# Patient Record
Sex: MALE | Race: WHITE | NOT HISPANIC OR LATINO
[De-identification: names, ages, dates, MRNs, and addresses within clinical notes are randomized per-mention and may not be internally consistent; named-entity substitution may affect disease eponyms.]

---

## 2017-01-31 ENCOUNTER — APPOINTMENT (OUTPATIENT)
Dept: CARDIOLOGY | Facility: CLINIC | Age: 72
End: 2017-01-31

## 2017-01-31 VITALS
DIASTOLIC BLOOD PRESSURE: 75 MMHG | HEART RATE: 77 BPM | SYSTOLIC BLOOD PRESSURE: 131 MMHG | BODY MASS INDEX: 26.46 KG/M2 | WEIGHT: 155 LBS | HEIGHT: 64 IN | TEMPERATURE: 98.3 F | OXYGEN SATURATION: 97 %

## 2017-01-31 RX ORDER — CLOTRIMAZOLE AND BETAMETHASONE DIPROPIONATE 10; .5 MG/G; MG/G
1-0.05 CREAM TOPICAL
Qty: 45 | Refills: 0 | Status: DISCONTINUED | COMMUNITY
Start: 2016-09-19

## 2017-01-31 RX ORDER — ATORVASTATIN CALCIUM 10 MG/1
10 TABLET, FILM COATED ORAL
Qty: 90 | Refills: 0 | Status: DISCONTINUED | COMMUNITY
Start: 2016-05-10

## 2017-02-01 LAB
ALBUMIN SERPL ELPH-MCNC: 4.4 G/DL
ALP BLD-CCNC: 91 U/L
ALT SERPL-CCNC: 38 U/L
ANION GAP SERPL CALC-SCNC: 21 MMOL/L
AST SERPL-CCNC: 35 U/L
BASOPHILS # BLD AUTO: 0.04 K/UL
BASOPHILS NFR BLD AUTO: 0.7 %
BILIRUB SERPL-MCNC: 0.4 MG/DL
BUN SERPL-MCNC: 11 MG/DL
CALCIUM SERPL-MCNC: 9.5 MG/DL
CHLORIDE SERPL-SCNC: 102 MMOL/L
CHOLEST SERPL-MCNC: 158 MG/DL
CHOLEST/HDLC SERPL: 2.7 RATIO
CK SERPL-CCNC: 290 U/L
CO2 SERPL-SCNC: 22 MMOL/L
CREAT SERPL-MCNC: 0.83 MG/DL
EOSINOPHIL # BLD AUTO: 0.27 K/UL
EOSINOPHIL NFR BLD AUTO: 4.6 %
GLUCOSE SERPL-MCNC: 105 MG/DL
HCT VFR BLD CALC: 42.6 %
HDLC SERPL-MCNC: 59 MG/DL
HGB BLD-MCNC: 14.1 G/DL
IMM GRANULOCYTES NFR BLD AUTO: 0.5 %
LDLC SERPL CALC-MCNC: 83 MG/DL
LYMPHOCYTES # BLD AUTO: 1.94 K/UL
LYMPHOCYTES NFR BLD AUTO: 32.9 %
MAN DIFF?: NORMAL
MCHC RBC-ENTMCNC: 30.4 PG
MCHC RBC-ENTMCNC: 33.1 GM/DL
MCV RBC AUTO: 91.8 FL
MONOCYTES # BLD AUTO: 0.56 K/UL
MONOCYTES NFR BLD AUTO: 9.5 %
NEUTROPHILS # BLD AUTO: 3.06 K/UL
NEUTROPHILS NFR BLD AUTO: 51.8 %
PLATELET # BLD AUTO: 257 K/UL
POTASSIUM SERPL-SCNC: 4.5 MMOL/L
PROT SERPL-MCNC: 7.6 G/DL
RBC # BLD: 4.64 M/UL
RBC # FLD: 13.4 %
SODIUM SERPL-SCNC: 145 MMOL/L
TRIGL SERPL-MCNC: 82 MG/DL
TSH SERPL-ACNC: 1.13 UIU/ML
WBC # FLD AUTO: 5.9 K/UL

## 2017-06-06 ENCOUNTER — NON-APPOINTMENT (OUTPATIENT)
Age: 72
End: 2017-06-06

## 2017-06-06 ENCOUNTER — APPOINTMENT (OUTPATIENT)
Dept: CARDIOLOGY | Facility: CLINIC | Age: 72
End: 2017-06-06

## 2017-06-06 VITALS
SYSTOLIC BLOOD PRESSURE: 130 MMHG | HEIGHT: 64 IN | HEART RATE: 65 BPM | WEIGHT: 154 LBS | BODY MASS INDEX: 26.29 KG/M2 | DIASTOLIC BLOOD PRESSURE: 82 MMHG | OXYGEN SATURATION: 96 %

## 2017-06-08 LAB
ALBUMIN SERPL ELPH-MCNC: 4.7 G/DL
ALP BLD-CCNC: 84 U/L
ALT SERPL-CCNC: 26 U/L
ANION GAP SERPL CALC-SCNC: 17 MMOL/L
AST SERPL-CCNC: 26 U/L
BASOPHILS # BLD AUTO: 0.02 K/UL
BASOPHILS NFR BLD AUTO: 0.3 %
BILIRUB SERPL-MCNC: 0.9 MG/DL
BUN SERPL-MCNC: 11 MG/DL
CALCIUM SERPL-MCNC: 9.3 MG/DL
CHLORIDE SERPL-SCNC: 102 MMOL/L
CHOLEST SERPL-MCNC: 228 MG/DL
CHOLEST/HDLC SERPL: 3.5 RATIO
CK SERPL-CCNC: 188 U/L
CO2 SERPL-SCNC: 25 MMOL/L
CREAT SERPL-MCNC: 0.83 MG/DL
EOSINOPHIL # BLD AUTO: 0.35 K/UL
EOSINOPHIL NFR BLD AUTO: 5.6 %
GLUCOSE SERPL-MCNC: 95 MG/DL
HCT VFR BLD CALC: 44.7 %
HDLC SERPL-MCNC: 66 MG/DL
HGB BLD-MCNC: 14.7 G/DL
IMM GRANULOCYTES NFR BLD AUTO: 0.2 %
LDLC SERPL CALC-MCNC: 123 MG/DL
LYMPHOCYTES # BLD AUTO: 1.72 K/UL
LYMPHOCYTES NFR BLD AUTO: 27.7 %
MAN DIFF?: NORMAL
MCHC RBC-ENTMCNC: 30.7 PG
MCHC RBC-ENTMCNC: 32.9 GM/DL
MCV RBC AUTO: 93.3 FL
MONOCYTES # BLD AUTO: 0.42 K/UL
MONOCYTES NFR BLD AUTO: 6.8 %
NEUTROPHILS # BLD AUTO: 3.69 K/UL
NEUTROPHILS NFR BLD AUTO: 59.4 %
PLATELET # BLD AUTO: 263 K/UL
POTASSIUM SERPL-SCNC: 4.6 MMOL/L
PROT SERPL-MCNC: 7.8 G/DL
RBC # BLD: 4.79 M/UL
RBC # FLD: 13.8 %
SODIUM SERPL-SCNC: 144 MMOL/L
TRIGL SERPL-MCNC: 193 MG/DL
WBC # FLD AUTO: 6.21 K/UL

## 2017-07-13 ENCOUNTER — RX RENEWAL (OUTPATIENT)
Age: 72
End: 2017-07-13

## 2017-08-01 ENCOUNTER — APPOINTMENT (OUTPATIENT)
Dept: OTHER | Facility: CLINIC | Age: 72
End: 2017-08-01
Payer: COMMERCIAL

## 2017-08-01 VITALS
HEART RATE: 82 BPM | SYSTOLIC BLOOD PRESSURE: 114 MMHG | HEIGHT: 64 IN | DIASTOLIC BLOOD PRESSURE: 76 MMHG | OXYGEN SATURATION: 96 %

## 2017-08-01 PROCEDURE — 99397 PER PM REEVAL EST PAT 65+ YR: CPT

## 2017-08-01 PROCEDURE — 99214 OFFICE O/P EST MOD 30 MIN: CPT | Mod: 25

## 2017-08-01 PROCEDURE — 96150: CPT

## 2017-08-01 PROCEDURE — 94010 BREATHING CAPACITY TEST: CPT

## 2017-08-02 LAB
ALBUMIN SERPL ELPH-MCNC: 4.6 G/DL
ALP BLD-CCNC: 80 U/L
ALT SERPL-CCNC: 31 U/L
ANION GAP SERPL CALC-SCNC: 18 MMOL/L
APPEARANCE: CLEAR
AST SERPL-CCNC: 31 U/L
BASOPHILS # BLD AUTO: 0.03 K/UL
BASOPHILS NFR BLD AUTO: 0.5 %
BILIRUB SERPL-MCNC: 0.5 MG/DL
BILIRUBIN URINE: NEGATIVE
BLOOD URINE: NEGATIVE
BUN SERPL-MCNC: 14 MG/DL
CALCIUM SERPL-MCNC: 9.7 MG/DL
CHLORIDE SERPL-SCNC: 102 MMOL/L
CHOLEST SERPL-MCNC: 205 MG/DL
CHOLEST/HDLC SERPL: 3.4 RATIO
CO2 SERPL-SCNC: 21 MMOL/L
COLOR: YELLOW
CREAT SERPL-MCNC: 0.94 MG/DL
EOSINOPHIL # BLD AUTO: 0.35 K/UL
EOSINOPHIL NFR BLD AUTO: 5.8 %
GLUCOSE QUALITATIVE U: NORMAL MG/DL
GLUCOSE SERPL-MCNC: 111 MG/DL
HCT VFR BLD CALC: 43.4 %
HDLC SERPL-MCNC: 60 MG/DL
HGB BLD-MCNC: 14.1 G/DL
IMM GRANULOCYTES NFR BLD AUTO: 0.3 %
KETONES URINE: NEGATIVE
LDLC SERPL CALC-MCNC: 99 MG/DL
LEUKOCYTE ESTERASE URINE: NEGATIVE
LYMPHOCYTES # BLD AUTO: 1.81 K/UL
LYMPHOCYTES NFR BLD AUTO: 30.2 %
MAN DIFF?: NORMAL
MCHC RBC-ENTMCNC: 30.1 PG
MCHC RBC-ENTMCNC: 32.5 GM/DL
MCV RBC AUTO: 92.5 FL
MONOCYTES # BLD AUTO: 0.4 K/UL
MONOCYTES NFR BLD AUTO: 6.7 %
NEUTROPHILS # BLD AUTO: 3.38 K/UL
NEUTROPHILS NFR BLD AUTO: 56.5 %
NITRITE URINE: NEGATIVE
PH URINE: 6
PLATELET # BLD AUTO: 278 K/UL
POTASSIUM SERPL-SCNC: 4.4 MMOL/L
PROT SERPL-MCNC: 7.5 G/DL
PROTEIN URINE: NEGATIVE MG/DL
RBC # BLD: 4.69 M/UL
RBC # FLD: 14.2 %
SODIUM SERPL-SCNC: 141 MMOL/L
SPECIFIC GRAVITY URINE: 1.01
TRIGL SERPL-MCNC: 229 MG/DL
UROBILINOGEN URINE: NORMAL MG/DL
WBC # FLD AUTO: 5.99 K/UL

## 2017-10-24 ENCOUNTER — APPOINTMENT (OUTPATIENT)
Dept: OTOLARYNGOLOGY | Facility: CLINIC | Age: 72
End: 2017-10-24

## 2017-12-14 ENCOUNTER — APPOINTMENT (OUTPATIENT)
Dept: CARDIOLOGY | Facility: CLINIC | Age: 72
End: 2017-12-14
Payer: MEDICARE

## 2017-12-14 ENCOUNTER — NON-APPOINTMENT (OUTPATIENT)
Age: 72
End: 2017-12-14

## 2017-12-14 VITALS
DIASTOLIC BLOOD PRESSURE: 81 MMHG | HEART RATE: 75 BPM | BODY MASS INDEX: 26.8 KG/M2 | TEMPERATURE: 98.3 F | OXYGEN SATURATION: 97 % | WEIGHT: 157 LBS | SYSTOLIC BLOOD PRESSURE: 132 MMHG | HEIGHT: 64 IN

## 2017-12-14 PROCEDURE — 93000 ELECTROCARDIOGRAM COMPLETE: CPT

## 2017-12-14 PROCEDURE — 99215 OFFICE O/P EST HI 40 MIN: CPT

## 2018-02-05 ENCOUNTER — RX RENEWAL (OUTPATIENT)
Age: 73
End: 2018-02-05

## 2018-06-12 ENCOUNTER — APPOINTMENT (OUTPATIENT)
Dept: CARDIOLOGY | Facility: CLINIC | Age: 73
End: 2018-06-12
Payer: MEDICARE

## 2018-06-12 ENCOUNTER — NON-APPOINTMENT (OUTPATIENT)
Age: 73
End: 2018-06-12

## 2018-06-12 VITALS
SYSTOLIC BLOOD PRESSURE: 137 MMHG | TEMPERATURE: 98 F | HEIGHT: 64 IN | DIASTOLIC BLOOD PRESSURE: 81 MMHG | BODY MASS INDEX: 27.14 KG/M2 | WEIGHT: 159 LBS | OXYGEN SATURATION: 96 % | HEART RATE: 62 BPM

## 2018-06-12 PROCEDURE — 99214 OFFICE O/P EST MOD 30 MIN: CPT

## 2018-06-12 PROCEDURE — 93000 ELECTROCARDIOGRAM COMPLETE: CPT

## 2018-06-13 LAB
ALBUMIN SERPL ELPH-MCNC: 4.5 G/DL
ALP BLD-CCNC: 81 U/L
ALT SERPL-CCNC: 20 U/L
ANION GAP SERPL CALC-SCNC: 14 MMOL/L
AST SERPL-CCNC: 24 U/L
BASOPHILS # BLD AUTO: 0.04 K/UL
BASOPHILS NFR BLD AUTO: 0.7 %
BILIRUB SERPL-MCNC: 0.5 MG/DL
BUN SERPL-MCNC: 14 MG/DL
CALCIUM SERPL-MCNC: 9.3 MG/DL
CHLORIDE SERPL-SCNC: 103 MMOL/L
CHOLEST SERPL-MCNC: 189 MG/DL
CHOLEST/HDLC SERPL: 2.8 RATIO
CO2 SERPL-SCNC: 23 MMOL/L
CREAT SERPL-MCNC: 0.94 MG/DL
EOSINOPHIL # BLD AUTO: 0.4 K/UL
EOSINOPHIL NFR BLD AUTO: 7.3 %
GLUCOSE SERPL-MCNC: 108 MG/DL
HCT VFR BLD CALC: 44.2 %
HDLC SERPL-MCNC: 68 MG/DL
HGB BLD-MCNC: 14 G/DL
IMM GRANULOCYTES NFR BLD AUTO: 0.2 %
LDLC SERPL CALC-MCNC: 100 MG/DL
LYMPHOCYTES # BLD AUTO: 1.52 K/UL
LYMPHOCYTES NFR BLD AUTO: 27.7 %
MAN DIFF?: NORMAL
MCHC RBC-ENTMCNC: 29.8 PG
MCHC RBC-ENTMCNC: 31.7 GM/DL
MCV RBC AUTO: 94 FL
MONOCYTES # BLD AUTO: 0.38 K/UL
MONOCYTES NFR BLD AUTO: 6.9 %
NEUTROPHILS # BLD AUTO: 3.13 K/UL
NEUTROPHILS NFR BLD AUTO: 57.2 %
PLATELET # BLD AUTO: 240 K/UL
POTASSIUM SERPL-SCNC: 4.4 MMOL/L
PROT SERPL-MCNC: 7.2 G/DL
RBC # BLD: 4.7 M/UL
RBC # FLD: 14.4 %
SODIUM SERPL-SCNC: 140 MMOL/L
TRIGL SERPL-MCNC: 107 MG/DL
WBC # FLD AUTO: 5.48 K/UL

## 2018-06-15 ENCOUNTER — RX RENEWAL (OUTPATIENT)
Age: 73
End: 2018-06-15

## 2018-07-24 ENCOUNTER — APPOINTMENT (OUTPATIENT)
Dept: OTHER | Facility: CLINIC | Age: 73
End: 2018-07-24
Payer: COMMERCIAL

## 2018-07-24 VITALS
HEART RATE: 69 BPM | HEIGHT: 64 IN | SYSTOLIC BLOOD PRESSURE: 124 MMHG | DIASTOLIC BLOOD PRESSURE: 79 MMHG | OXYGEN SATURATION: 97 % | RESPIRATION RATE: 14 BRPM

## 2018-07-24 PROCEDURE — 96150: CPT

## 2018-07-24 PROCEDURE — 99214 OFFICE O/P EST MOD 30 MIN: CPT | Mod: 25

## 2018-07-24 PROCEDURE — 94010 BREATHING CAPACITY TEST: CPT

## 2018-07-24 PROCEDURE — 99397 PER PM REEVAL EST PAT 65+ YR: CPT | Mod: 25

## 2018-07-25 LAB
ALBUMIN SERPL ELPH-MCNC: 4.2 G/DL
ALP BLD-CCNC: 75 U/L
ALT SERPL-CCNC: 24 U/L
ANION GAP SERPL CALC-SCNC: 17 MMOL/L
APPEARANCE: CLEAR
AST SERPL-CCNC: 30 U/L
BASOPHILS # BLD AUTO: 0.02 K/UL
BASOPHILS NFR BLD AUTO: 0.3 %
BILIRUB SERPL-MCNC: 0.6 MG/DL
BILIRUBIN URINE: NEGATIVE
BLOOD URINE: NEGATIVE
BUN SERPL-MCNC: 16 MG/DL
CALCIUM SERPL-MCNC: 9.1 MG/DL
CHLORIDE SERPL-SCNC: 102 MMOL/L
CHOLEST SERPL-MCNC: 167 MG/DL
CHOLEST/HDLC SERPL: 2.9 RATIO
CO2 SERPL-SCNC: 22 MMOL/L
COLOR: YELLOW
CREAT SERPL-MCNC: 0.81 MG/DL
EOSINOPHIL # BLD AUTO: 0.36 K/UL
EOSINOPHIL NFR BLD AUTO: 6.2 %
GLUCOSE QUALITATIVE U: NEGATIVE MG/DL
GLUCOSE SERPL-MCNC: 89 MG/DL
HCT VFR BLD CALC: 40.6 %
HDLC SERPL-MCNC: 57 MG/DL
HGB BLD-MCNC: 13.7 G/DL
IMM GRANULOCYTES NFR BLD AUTO: 0.2 %
KETONES URINE: NEGATIVE
LDLC SERPL CALC-MCNC: 82 MG/DL
LEUKOCYTE ESTERASE URINE: NEGATIVE
LYMPHOCYTES # BLD AUTO: 1.8 K/UL
LYMPHOCYTES NFR BLD AUTO: 31 %
MAN DIFF?: NORMAL
MCHC RBC-ENTMCNC: 30.8 PG
MCHC RBC-ENTMCNC: 33.7 GM/DL
MCV RBC AUTO: 91.2 FL
MONOCYTES # BLD AUTO: 0.39 K/UL
MONOCYTES NFR BLD AUTO: 6.7 %
NEUTROPHILS # BLD AUTO: 3.23 K/UL
NEUTROPHILS NFR BLD AUTO: 55.6 %
NITRITE URINE: NEGATIVE
PH URINE: 7
PLATELET # BLD AUTO: 235 K/UL
POTASSIUM SERPL-SCNC: 4.4 MMOL/L
PROT SERPL-MCNC: 7 G/DL
PROTEIN URINE: NEGATIVE MG/DL
RBC # BLD: 4.45 M/UL
RBC # FLD: 13.7 %
SODIUM SERPL-SCNC: 141 MMOL/L
SPECIFIC GRAVITY URINE: 1.01
TRIGL SERPL-MCNC: 140 MG/DL
UROBILINOGEN URINE: NEGATIVE MG/DL
WBC # FLD AUTO: 5.81 K/UL

## 2018-07-30 ENCOUNTER — RX RENEWAL (OUTPATIENT)
Age: 73
End: 2018-07-30

## 2018-07-30 RX ORDER — FEXOFENADINE HYDROCHLORIDE 180 MG/1
180 TABLET, FILM COATED ORAL
Qty: 30 | Refills: 5 | Status: DISCONTINUED | COMMUNITY
Start: 2017-08-01 | End: 2018-07-30

## 2018-12-11 ENCOUNTER — NON-APPOINTMENT (OUTPATIENT)
Age: 73
End: 2018-12-11

## 2018-12-11 ENCOUNTER — APPOINTMENT (OUTPATIENT)
Dept: CARDIOLOGY | Facility: CLINIC | Age: 73
End: 2018-12-11
Payer: MEDICARE

## 2018-12-11 VITALS
HEART RATE: 70 BPM | BODY MASS INDEX: 26.8 KG/M2 | HEIGHT: 64 IN | WEIGHT: 157 LBS | DIASTOLIC BLOOD PRESSURE: 80 MMHG | SYSTOLIC BLOOD PRESSURE: 120 MMHG | OXYGEN SATURATION: 98 %

## 2018-12-11 PROCEDURE — 99214 OFFICE O/P EST MOD 30 MIN: CPT

## 2018-12-11 PROCEDURE — 93000 ELECTROCARDIOGRAM COMPLETE: CPT

## 2018-12-11 NOTE — HISTORY OF PRESENT ILLNESS
[FreeTextEntry1] : His muscle aches are not present when he takes Lipitor 20 qd.\par He continues to take fish oil.\par Walks and has no chest pain or SOB with exertion.\par Taking his meds and aspirin as directed.\par Was running toward the train and felt great.\par

## 2018-12-11 NOTE — DISCUSSION/SUMMARY
[FreeTextEntry1] : He is a 73 year old with hyperlipidemia and rheumatic fever who has evidence of coronary atherosclerosis without angina.\par No ECG changes seen.\par I encouraged exercise again and continued medical therapy.\par BP is optimal. \par If any symptoms of chest pains arise he will call.\par Continue high dose fish oil 2-4 g /day in addition to lipiotr.\par LDL is at goal. (87)\par followup in 6 months.

## 2018-12-11 NOTE — PHYSICAL EXAM
[General Appearance - Well Developed] : well developed [General Appearance - Well Nourished] : well nourished [General Appearance - In No Acute Distress] : no acute distress [Normal Conjunctiva] : the conjunctiva exhibited no abnormalities [No Oral Pallor] : no oral pallor [Normal Jugular Venous V Waves Present] : normal jugular venous V waves present [Respiration, Rhythm And Depth] : normal respiratory rhythm and effort [Auscultation Breath Sounds / Voice Sounds] : lungs were clear to auscultation bilaterally [Heart Sounds] : normal S1 and S2 [Murmurs] : no murmurs present [Arterial Pulses Normal] : the arterial pulses were normal [Edema] : no peripheral edema present [Regular] : the rhythm was regular [Bowel Sounds] : normal bowel sounds [Abdomen Soft] : soft [Abnormal Walk] : normal gait [Cyanosis, Localized] : no localized cyanosis [Skin Turgor] : normal skin turgor [Oriented To Time, Place, And Person] : oriented to person, place, and time [Impaired Insight] : insight and judgment were intact [Affect] : the affect was normal

## 2018-12-11 NOTE — REVIEW OF SYSTEMS
[see HPI] : see HPI [Shortness Of Breath] : shortness of breath [Dyspnea on exertion] : dyspnea during exertion [Chest Pain] : no chest pain [Palpitations] : no palpitations [Negative] : Heme/Lymph

## 2019-03-06 ENCOUNTER — APPOINTMENT (OUTPATIENT)
Dept: OTHER | Facility: CLINIC | Age: 74
End: 2019-03-06
Payer: COMMERCIAL

## 2019-03-06 VITALS
BODY MASS INDEX: 26.46 KG/M2 | DIASTOLIC BLOOD PRESSURE: 76 MMHG | WEIGHT: 155 LBS | HEIGHT: 64 IN | SYSTOLIC BLOOD PRESSURE: 128 MMHG | HEART RATE: 79 BPM | OXYGEN SATURATION: 96 % | RESPIRATION RATE: 15 BRPM

## 2019-03-06 DIAGNOSIS — H10.45 OTHER CHRONIC ALLERGIC CONJUNCTIVITIS: ICD-10-CM

## 2019-03-06 PROCEDURE — 99213 OFFICE O/P EST LOW 20 MIN: CPT | Mod: 25

## 2019-03-06 NOTE — HISTORY OF PRESENT ILLNESS
[FreeTextEntry1] : pt here for follow up WTC covered rhinitis and GERD \par c/o bad  " allergies" - nasal conegsiton , runny nose, itchy eyes \par  tries to use saline spray most of the time, currently using Flonase daily and Azelastine \par Allegra as needed \par He was evaluated by ENT and recommended IT and dev septum surgery, \par He was evaluated by an eye doctor and found too have cataract\par c/o itchy watery eyes- using Patanol eye drops with improvement \par \par has tried Beconase, Nasonex and Flonase in the past, feels that these work similarly; tried antihistamines in the past which he felt worked well but stopped these "because of my prostate"\par \par he stated that he does snore and was diagnosed with EVERTON shortly after 09 11 2001 but unable to tolerate the CPAP mask \par he stated that he had " walking pneumonia " and was teated with Abx 02 2018 \par  has no cough, wheezing or SOB at this time\par reported that was vaccinated for Pneumonia in the past \par Exp hx: worked at [employer] 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 \par \par \par colonoscopy 10 21 2014- 2 mm tubular adenoma \par  \par  hyperlipidemia and rheumatic fever ,  has evidence of coronary atherosclerosis without angina.\par followed by Dr LIsker , Cardiologist

## 2019-03-06 NOTE — PHYSICAL EXAM
[General Appearance - Alert] : alert [General Appearance - In No Acute Distress] : in no acute distress [Sclera] : the sclera and conjunctiva were normal [PERRL With Normal Accommodation] : pupils were equal in size, round, and reactive to light [Extraocular Movements] : extraocular movements were intact [FreeTextEntry1] : deviated nasal septum to the L, erythema L nasal septum, R nasal Congestion- swelling in nasal passaged   [Neck Appearance] : the appearance of the neck was normal [Neck Cervical Mass (___cm)] : no neck mass was observed [Jugular Venous Distention Increased] : there was no jugular-venous distention [Thyroid Diffuse Enlargement] : the thyroid was not enlarged [Thyroid Nodule] : there were no palpable thyroid nodules [Auscultation Breath Sounds / Voice Sounds] : lungs were clear to auscultation bilaterally [Heart Rate And Rhythm] : heart rate was normal and rhythm regular [Heart Sounds] : normal S1 and S2 [Heart Sounds Gallop] : no gallops [Murmurs] : no murmurs [Heart Sounds Pericardial Friction Rub] : no pericardial rub [Bowel Sounds] : normal bowel sounds [Abdomen Soft] : soft [Abdomen Tenderness] : non-tender [] : no hepato-splenomegaly [Abdomen Mass (___ Cm)] : no abdominal mass palpated

## 2019-03-06 NOTE — ASSESSMENT
[FreeTextEntry1] : all meds and sprays renewed \par  will start trial of Singulair \par pt to return in 4-6 weeks for LFT check up \par \par \par  colonoscopy - later this year \par pt will see me in 07 2019 for annual WTC visit - will refer to GI at that time for colonoscopy

## 2019-03-08 ENCOUNTER — RX RENEWAL (OUTPATIENT)
Age: 74
End: 2019-03-08

## 2019-03-15 ENCOUNTER — RX RENEWAL (OUTPATIENT)
Age: 74
End: 2019-03-15

## 2019-07-15 ENCOUNTER — APPOINTMENT (OUTPATIENT)
Dept: INTERNAL MEDICINE | Facility: CLINIC | Age: 74
End: 2019-07-15

## 2019-07-16 ENCOUNTER — APPOINTMENT (OUTPATIENT)
Dept: CARDIOLOGY | Facility: CLINIC | Age: 74
End: 2019-07-16
Payer: MEDICARE

## 2019-07-16 ENCOUNTER — NON-APPOINTMENT (OUTPATIENT)
Age: 74
End: 2019-07-16

## 2019-07-16 VITALS
OXYGEN SATURATION: 98 % | WEIGHT: 155 LBS | HEIGHT: 64 IN | BODY MASS INDEX: 26.46 KG/M2 | DIASTOLIC BLOOD PRESSURE: 72 MMHG | HEART RATE: 56 BPM | SYSTOLIC BLOOD PRESSURE: 134 MMHG | RESPIRATION RATE: 15 BRPM

## 2019-07-16 DIAGNOSIS — M17.12 UNILATERAL PRIMARY OSTEOARTHRITIS, LEFT KNEE: ICD-10-CM

## 2019-07-16 DIAGNOSIS — Z01.810 ENCOUNTER FOR PREPROCEDURAL CARDIOVASCULAR EXAMINATION: ICD-10-CM

## 2019-07-16 PROCEDURE — 93000 ELECTROCARDIOGRAM COMPLETE: CPT | Mod: NC

## 2019-07-16 PROCEDURE — 99214 OFFICE O/P EST MOD 30 MIN: CPT

## 2019-07-16 RX ORDER — ASPIRIN ENTERIC COATED TABLETS 81 MG 81 MG/1
81 TABLET, DELAYED RELEASE ORAL
Qty: 30 | Refills: 2 | Status: ACTIVE | COMMUNITY
Start: 2019-07-16

## 2019-07-16 RX ORDER — OMEGA-3/DHA/EPA/FISH OIL 300-1000MG
1000 CAPSULE ORAL
Qty: 180 | Refills: 0 | Status: ACTIVE | COMMUNITY
Start: 2019-07-16 | End: 1900-01-01

## 2019-07-16 NOTE — HISTORY OF PRESENT ILLNESS
[FreeTextEntry1] : left knee surgery planned.\par \par And he has severe arthritis in his left knee with a torn meniscus that is going to be repaired. \par He denies any exertional chest pains or shortness of breath, though he does not exert himself much of his knee pain.\par He has been taking his medications as directed and reports that recent blood work was okay.\par \par

## 2019-07-16 NOTE — DISCUSSION/SUMMARY
[FreeTextEntry1] : He is a 74 year old with hyperlipidemia and rheumatic fever who has evidence of coronary atherosclerosis without angina. Knee surgery is upcoming.\par No ECG changes seen.\par BP control is good.\par He may proceed with the planned surgery as he has no active signs of coronary disease.\par I encouraged him to continue all of his usual medications and he may hold aspirin the night before the procedure and restarted immediately thereafter.\par No other changes to his statin dose are suggested.\par \par followup in 6 months.

## 2019-07-16 NOTE — REVIEW OF SYSTEMS
[see HPI] : see HPI [Shortness Of Breath] : shortness of breath [Dyspnea on exertion] : dyspnea during exertion [Negative] : Heme/Lymph [Chest Pain] : no chest pain [Palpitations] : no palpitations

## 2019-07-22 ENCOUNTER — NON-APPOINTMENT (OUTPATIENT)
Age: 74
End: 2019-07-22

## 2019-08-20 ENCOUNTER — APPOINTMENT (OUTPATIENT)
Dept: OTHER | Facility: CLINIC | Age: 74
End: 2019-08-20
Payer: COMMERCIAL

## 2019-08-20 VITALS
WEIGHT: 156 LBS | BODY MASS INDEX: 26.63 KG/M2 | DIASTOLIC BLOOD PRESSURE: 63 MMHG | HEIGHT: 64 IN | OXYGEN SATURATION: 96 % | RESPIRATION RATE: 18 BRPM | TEMPERATURE: 98.2 F | HEART RATE: 79 BPM | SYSTOLIC BLOOD PRESSURE: 97 MMHG

## 2019-08-20 PROCEDURE — 99214 OFFICE O/P EST MOD 30 MIN: CPT | Mod: 25

## 2019-08-20 PROCEDURE — 99396 PREV VISIT EST AGE 40-64: CPT | Mod: 25

## 2019-08-20 PROCEDURE — 94010 BREATHING CAPACITY TEST: CPT

## 2019-08-20 NOTE — PHYSICAL EXAM
[General Appearance - Alert] : alert [Sclera] : the sclera and conjunctiva were normal [General Appearance - In No Acute Distress] : in no acute distress [PERRL With Normal Accommodation] : pupils were equal in size, round, and reactive to light [FreeTextEntry1] : deviated nasal septum to the L, erythema L nasal septum, R nasal Congestion- swelling in nasal passaged   [Extraocular Movements] : extraocular movements were intact [Neck Cervical Mass (___cm)] : no neck mass was observed [Neck Appearance] : the appearance of the neck was normal [Thyroid Diffuse Enlargement] : the thyroid was not enlarged [Jugular Venous Distention Increased] : there was no jugular-venous distention [Thyroid Nodule] : there were no palpable thyroid nodules [Heart Rate And Rhythm] : heart rate was normal and rhythm regular [Auscultation Breath Sounds / Voice Sounds] : lungs were clear to auscultation bilaterally [Heart Sounds Gallop] : no gallops [Heart Sounds] : normal S1 and S2 [Murmurs] : no murmurs [Heart Sounds Pericardial Friction Rub] : no pericardial rub [Abdomen Soft] : soft [Bowel Sounds] : normal bowel sounds [Abdomen Mass (___ Cm)] : no abdominal mass palpated [] : no hepato-splenomegaly [Abdomen Tenderness] : non-tender

## 2019-08-20 NOTE — HISTORY OF PRESENT ILLNESS
[FreeTextEntry1] : pt here for follow up WTC covered rhinitis and GERD \par " allergies" - nasal congestion , runny nose, itchy eyes \par  nasal sprays and eye drops are helpful with his Sx \par  tries to use saline spray most of the time, currently using Flonase daily \par Allegra as needed \par He was evaluated by ENT and recommended IT and dev septum surgery, \par He was evaluated by an eye doctor and found too have cataract\par c/o itchy watery eyes- using Patanol eye drops with improvement \par \par has tried Beconase, Nasonex and Flonase in the past, feels that these work similarly; tried antihistamines in the past which he felt worked well but stopped these "because of my prostate"\par \par he stated that he does snore and was diagnosed with EVERTON shortly after 09 11 2001 but unable to tolerate the CPAP mask \par he stated that he had " walking pneumonia " and was teated with Abx 02 2018 \par  has no cough, wheezing or SOB at this time\par reported that was vaccinated for Pneumonia in the past \par Exp hx: worked at [employer] 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 \par 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[employer]; 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 \par He denies any exertional chest pains or shortness of breath, though he does not exert himself much of his knee pain.\par  hyperlipidemia and Hx rheumatic fever ,  has evidence of coronary atherosclerosis without angina.\par followed by Dr LIsker , Cardiologist \par \par And he has severe arthritis in his left knee with a torn meniscus that is going to be repaired. \par He denies any exertional chest pains or shortness of breath, though he does not exert himself much of his knee pain.\par he also c/o R plants pain - plantar fasciitis

## 2019-08-20 NOTE — DISCUSSION/SUMMARY
[Patient seen for WTC Monitoring ___] : Patient was seen for WTC monitoring [unfilled] [FreeTextEntry3] : see Follow up follow up note  [Please See Note in Chart and Documentation in Trial DB] : Please see note in chart and documentation in Trial DB.

## 2019-08-20 NOTE — PHYSICAL EXAM
[General Appearance - Alert] : alert [General Appearance - In No Acute Distress] : in no acute distress [Sclera] : the sclera and conjunctiva were normal [PERRL With Normal Accommodation] : pupils were equal in size, round, and reactive to light [Extraocular Movements] : extraocular movements were intact [FreeTextEntry1] : deviated nasal septum to the L, erythema L nasal septum, R nasal Congestion- swelling in nasal passaged   [Neck Cervical Mass (___cm)] : no neck mass was observed [Neck Appearance] : the appearance of the neck was normal [Jugular Venous Distention Increased] : there was no jugular-venous distention [Thyroid Diffuse Enlargement] : the thyroid was not enlarged [Thyroid Nodule] : there were no palpable thyroid nodules [Heart Rate And Rhythm] : heart rate was normal and rhythm regular [Auscultation Breath Sounds / Voice Sounds] : lungs were clear to auscultation bilaterally [Heart Sounds] : normal S1 and S2 [Heart Sounds Gallop] : no gallops [Murmurs] : no murmurs [Heart Sounds Pericardial Friction Rub] : no pericardial rub [Bowel Sounds] : normal bowel sounds [Abdomen Soft] : soft [Abdomen Mass (___ Cm)] : no abdominal mass palpated [Abdomen Tenderness] : non-tender [] : no hepato-splenomegaly

## 2019-08-20 NOTE — HISTORY OF PRESENT ILLNESS
[FreeTextEntry1] : pt here for follow up WTC covered rhinitis and GERD \par " allergies" - nasal congestion , runny nose, itchy eyes \par  nasal sprays and eye drops are helpful with his Sx \par  tries to use saline spray most of the time, currently using Flonase daily \par Allegra as needed \par He was evaluated by ENT and recommended IT and dev septum surgery, \par He was evaluated by an eye doctor and found too have cataract\par c/o itchy watery eyes- using Patanol eye drops with improvement \par \par has tried Beconase, Nasonex and Flonase in the past, feels that these work similarly; tried antihistamines in the past which he felt worked well but stopped these "because of my prostate"\par \par he stated that he does snore and was diagnosed with EVERTON shortly after 09 11 2001 but unable to tolerate the CPAP mask \par he stated that he had " walking pneumonia " and was teated with Abx 02 2018 \par  has no cough, wheezing or SOB at this time\par reported that was vaccinated for Pneumonia in the past \par Exp hx: worked at [employer] 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 \par has no Sx of pulm disease \par \par \par  And he has severe arthritis in his left knee with a torn meniscus that is going to be repaired. \par He denies any exertional chest pains or shortness of breath, though he does not exert himself much of his knee pain.\par  hyperlipidemia and Hx rheumatic fever ,  has evidence of coronary atherosclerosis without angina.\par followed by Dr LIsker , Cardiologist \par \par And he has severe arthritis in his left knee with a torn meniscus that is going to be repaired. \par He denies any exertional chest pains or shortness of breath, though he does not exert himself much of his knee pain.\par he also c/o R plants pain - plantar fasciitis

## 2019-08-20 NOTE — HEALTH RISK ASSESSMENT
[ColonoscopyDate] : 10/21/2014 [Patient reported colonoscopy was abnormal] : Patient reported colonoscopy was abnormal [ColonoscopyComments] : tubular adenoma

## 2019-08-20 NOTE — DISCUSSION/SUMMARY
[Patient seen for WTC Monitoring ___] : Patient was seen for WTC monitoring [unfilled] [Please See Note in Chart and Documentation in Trial DB] : Please see note in chart and documentation in Trial DB. [FreeTextEntry3] : see Follow up follow up note

## 2019-08-20 NOTE — ASSESSMENT
[FreeTextEntry1] : all meds and nasal  sprays renewed \par  continue Singulair \par LFT check up \par \par \par  colonoscopy - due this year \par referral to GI

## 2019-08-21 LAB
ALBUMIN SERPL ELPH-MCNC: 4.3 G/DL
ALP BLD-CCNC: 63 U/L
ALT SERPL-CCNC: 17 U/L
ANION GAP SERPL CALC-SCNC: 15 MMOL/L
APPEARANCE: CLEAR
AST SERPL-CCNC: 22 U/L
BACTERIA: NEGATIVE
BASOPHILS # BLD AUTO: 0.03 K/UL
BASOPHILS NFR BLD AUTO: 0.5 %
BILIRUB SERPL-MCNC: 0.5 MG/DL
BILIRUBIN URINE: NEGATIVE
BLOOD URINE: NEGATIVE
BUN SERPL-MCNC: 11 MG/DL
CALCIUM SERPL-MCNC: 9.4 MG/DL
CHLORIDE SERPL-SCNC: 102 MMOL/L
CHOLEST SERPL-MCNC: 158 MG/DL
CHOLEST/HDLC SERPL: 3.2 RATIO
CO2 SERPL-SCNC: 22 MMOL/L
COLOR: NORMAL
CREAT SERPL-MCNC: 0.86 MG/DL
EOSINOPHIL # BLD AUTO: 0.29 K/UL
EOSINOPHIL NFR BLD AUTO: 4.9 %
GLUCOSE QUALITATIVE U: NEGATIVE
GLUCOSE SERPL-MCNC: 107 MG/DL
HCT VFR BLD CALC: 40.6 %
HDLC SERPL-MCNC: 49 MG/DL
HGB BLD-MCNC: 13.2 G/DL
HYALINE CASTS: 0 /LPF
IMM GRANULOCYTES NFR BLD AUTO: 0.3 %
KETONES URINE: NEGATIVE
LDLC SERPL CALC-MCNC: 54 MG/DL
LEUKOCYTE ESTERASE URINE: NEGATIVE
LYMPHOCYTES # BLD AUTO: 1.36 K/UL
LYMPHOCYTES NFR BLD AUTO: 22.9 %
MAN DIFF?: NORMAL
MCHC RBC-ENTMCNC: 30.4 PG
MCHC RBC-ENTMCNC: 32.5 GM/DL
MCV RBC AUTO: 93.5 FL
MICROSCOPIC-UA: NORMAL
MONOCYTES # BLD AUTO: 0.56 K/UL
MONOCYTES NFR BLD AUTO: 9.4 %
NEUTROPHILS # BLD AUTO: 3.69 K/UL
NEUTROPHILS NFR BLD AUTO: 62 %
NITRITE URINE: NEGATIVE
PH URINE: 5.5
PLATELET # BLD AUTO: 219 K/UL
POTASSIUM SERPL-SCNC: 4.2 MMOL/L
PROT SERPL-MCNC: 7 G/DL
PROTEIN URINE: NEGATIVE
RBC # BLD: 4.34 M/UL
RBC # FLD: 13.2 %
RED BLOOD CELLS URINE: 0 /HPF
SODIUM SERPL-SCNC: 139 MMOL/L
SPECIFIC GRAVITY URINE: 1.01
SQUAMOUS EPITHELIAL CELLS: 0 /HPF
TRIGL SERPL-MCNC: 275 MG/DL
UROBILINOGEN URINE: NORMAL
WBC # FLD AUTO: 5.95 K/UL
WHITE BLOOD CELLS URINE: 1 /HPF

## 2019-09-17 ENCOUNTER — RX RENEWAL (OUTPATIENT)
Age: 74
End: 2019-09-17

## 2019-09-27 ENCOUNTER — MEDICATION RENEWAL (OUTPATIENT)
Age: 74
End: 2019-09-27

## 2019-09-30 ENCOUNTER — RX RENEWAL (OUTPATIENT)
Age: 74
End: 2019-09-30

## 2019-10-15 ENCOUNTER — APPOINTMENT (OUTPATIENT)
Dept: GASTROENTEROLOGY | Facility: CLINIC | Age: 74
End: 2019-10-15
Payer: COMMERCIAL

## 2019-10-15 VITALS
HEIGHT: 64 IN | DIASTOLIC BLOOD PRESSURE: 68 MMHG | SYSTOLIC BLOOD PRESSURE: 120 MMHG | RESPIRATION RATE: 15 BRPM | TEMPERATURE: 98.1 F | OXYGEN SATURATION: 97 % | BODY MASS INDEX: 26.46 KG/M2 | WEIGHT: 155 LBS | HEART RATE: 73 BPM

## 2019-10-15 DIAGNOSIS — Z86.010 PERSONAL HISTORY OF COLONIC POLYPS: ICD-10-CM

## 2019-10-15 PROCEDURE — 99204 OFFICE O/P NEW MOD 45 MIN: CPT

## 2019-10-15 NOTE — PHYSICAL EXAM
[General Appearance - Alert] : alert [General Appearance - In No Acute Distress] : in no acute distress [PERRL With Normal Accommodation] : pupils were equal in size, round, and reactive to light [Extraocular Movements] : extraocular movements were intact [Sclera] : the sclera and conjunctiva were normal [Oropharynx] : the oropharynx was normal [Outer Ear] : the ears and nose were normal in appearance [Neck Appearance] : the appearance of the neck was normal [Jugular Venous Distention Increased] : there was no jugular-venous distention [Neck Cervical Mass (___cm)] : no neck mass was observed [Thyroid Diffuse Enlargement] : the thyroid was not enlarged [Thyroid Nodule] : there were no palpable thyroid nodules [Heart Rate And Rhythm] : heart rate was normal and rhythm regular [Auscultation Breath Sounds / Voice Sounds] : lungs were clear to auscultation bilaterally [Heart Sounds Gallop] : no gallops [Heart Sounds] : normal S1 and S2 [Murmurs] : no murmurs [Edema] : there was no peripheral edema [Heart Sounds Pericardial Friction Rub] : no pericardial rub [Abdomen Soft] : soft [Bowel Sounds] : normal bowel sounds [Abdomen Mass (___ Cm)] : no abdominal mass palpated [Abdomen Tenderness] : non-tender [Cervical Lymph Nodes Enlarged Anterior Bilaterally] : anterior cervical [Cervical Lymph Nodes Enlarged Posterior Bilaterally] : posterior cervical [Supraclavicular Lymph Nodes Enlarged Bilaterally] : supraclavicular [Axillary Lymph Nodes Enlarged Bilaterally] : axillary [Inguinal Lymph Nodes Enlarged Bilaterally] : inguinal [Femoral Lymph Nodes Enlarged Bilaterally] : femoral [No CVA Tenderness] : no ~M costovertebral angle tenderness [No Spinal Tenderness] : no spinal tenderness [Nail Clubbing] : no clubbing  or cyanosis of the fingernails [Abnormal Walk] : normal gait [Motor Tone] : muscle strength and tone were normal [Musculoskeletal - Swelling] : no joint swelling seen [Skin Color & Pigmentation] : normal skin color and pigmentation [] : no rash [Skin Turgor] : normal skin turgor [Impaired Insight] : insight and judgment were intact [Oriented To Time, Place, And Person] : oriented to person, place, and time [Affect] : the affect was normal [FreeTextEntry1] : ventral hernia

## 2019-10-15 NOTE — ASSESSMENT
[FreeTextEntry1] : 74 yr male, history of colon adenoma, due for surveillance\par Reflux, complaints unchanged\par No indication for repeat EGD\par \par Indications risks benefits and alternatives to colonoscopy for surveillance purposes reviewed\par He agrees

## 2019-10-15 NOTE — HISTORY OF PRESENT ILLNESS
[de-identified] : Dictation inactive\par \par 74 yr male, colon adenoma on last exam 5 yrs ago, here for follow up colooscopy.\par No interval bowel complaints\par Stable reflux complaints, now on famotidine prn.  Uses approximately 2 times a week\par No dysphagia\par EGD 5 yrs ago without esophagitis, small hiatus hernia\par \par Denies CAD, CHF or dysrhythmia\par \par \par \par SH retired , teaching at [employer]

## 2019-10-30 ENCOUNTER — APPOINTMENT (OUTPATIENT)
Dept: GASTROENTEROLOGY | Facility: AMBULATORY MEDICAL SERVICES | Age: 74
End: 2019-10-30
Payer: COMMERCIAL

## 2019-10-30 PROCEDURE — 45378 DIAGNOSTIC COLONOSCOPY: CPT

## 2019-11-11 ENCOUNTER — RX RENEWAL (OUTPATIENT)
Age: 74
End: 2019-11-11

## 2019-11-13 ENCOUNTER — TRANSCRIPTION ENCOUNTER (OUTPATIENT)
Age: 74
End: 2019-11-13

## 2019-12-16 ENCOUNTER — RX RENEWAL (OUTPATIENT)
Age: 74
End: 2019-12-16

## 2020-01-29 ENCOUNTER — APPOINTMENT (OUTPATIENT)
Dept: CARDIOLOGY | Facility: CLINIC | Age: 75
End: 2020-01-29
Payer: MEDICARE

## 2020-01-29 ENCOUNTER — NON-APPOINTMENT (OUTPATIENT)
Age: 75
End: 2020-01-29

## 2020-01-29 VITALS
OXYGEN SATURATION: 98 % | BODY MASS INDEX: 26.43 KG/M2 | HEART RATE: 73 BPM | SYSTOLIC BLOOD PRESSURE: 140 MMHG | WEIGHT: 154 LBS | DIASTOLIC BLOOD PRESSURE: 70 MMHG

## 2020-01-29 DIAGNOSIS — I25.10 ATHEROSCLEROTIC HEART DISEASE OF NATIVE CORONARY ARTERY W/OUT ANGINA PECTORIS: ICD-10-CM

## 2020-01-29 PROCEDURE — 99214 OFFICE O/P EST MOD 30 MIN: CPT

## 2020-01-29 PROCEDURE — 93000 ELECTROCARDIOGRAM COMPLETE: CPT

## 2020-01-29 NOTE — HISTORY OF PRESENT ILLNESS
[FreeTextEntry1] : s/p left knee surgery but is still undergoing rehab.\par No chest pain, but persistent exertional dyspena after climbing the stairs to the train.\par  \par He has been taking his medications as directed and LDL in August was 54\par

## 2020-01-29 NOTE — PHYSICAL EXAM
[General Appearance - Well Developed] : well developed [General Appearance - In No Acute Distress] : no acute distress [Normal Conjunctiva] : the conjunctiva exhibited no abnormalities [General Appearance - Well Nourished] : well nourished [No Oral Pallor] : no oral pallor [Normal Jugular Venous V Waves Present] : normal jugular venous V waves present [Respiration, Rhythm And Depth] : normal respiratory rhythm and effort [Auscultation Breath Sounds / Voice Sounds] : lungs were clear to auscultation bilaterally [Heart Sounds] : normal S1 and S2 [Murmurs] : no murmurs present [Arterial Pulses Normal] : the arterial pulses were normal [Regular] : the rhythm was regular [Edema] : no peripheral edema present [Abdomen Soft] : soft [Bowel Sounds] : normal bowel sounds [Abnormal Walk] : normal gait [Cyanosis, Localized] : no localized cyanosis [Skin Turgor] : normal skin turgor [Impaired Insight] : insight and judgment were intact [Oriented To Time, Place, And Person] : oriented to person, place, and time [Affect] : the affect was normal

## 2020-01-29 NOTE — REVIEW OF SYSTEMS
[see HPI] : see HPI [Dyspnea on exertion] : dyspnea during exertion [Shortness Of Breath] : shortness of breath [Chest Pain] : no chest pain [Palpitations] : no palpitations [Negative] : Heme/Lymph

## 2020-01-29 NOTE — DISCUSSION/SUMMARY
[FreeTextEntry1] : He is a 74 year old with hyperlipidemia and rheumatic fever who has evidence of coronary atherosclerosis without angina. No ECG changes seen.\par BP control is adequate.\par Labs as above, he requested DM screening.\par Continue meds as above.\par Call if dyspnea worsens or angina surfaces.\par  \par followup in 6 months.

## 2020-01-31 LAB
ALBUMIN SERPL ELPH-MCNC: 4.9 G/DL
ALP BLD-CCNC: 74 U/L
ALT SERPL-CCNC: 22 U/L
ANION GAP SERPL CALC-SCNC: 11 MMOL/L
AST SERPL-CCNC: 24 U/L
BASOPHILS # BLD AUTO: 0.05 K/UL
BASOPHILS NFR BLD AUTO: 0.9 %
BILIRUB SERPL-MCNC: 0.4 MG/DL
BUN SERPL-MCNC: 14 MG/DL
CALCIUM SERPL-MCNC: 9.6 MG/DL
CHLORIDE SERPL-SCNC: 103 MMOL/L
CHOLEST SERPL-MCNC: 182 MG/DL
CHOLEST/HDLC SERPL: 2.7 RATIO
CO2 SERPL-SCNC: 25 MMOL/L
CREAT SERPL-MCNC: 0.8 MG/DL
EOSINOPHIL # BLD AUTO: 0.38 K/UL
EOSINOPHIL NFR BLD AUTO: 6.7 %
ESTIMATED AVERAGE GLUCOSE: 128 MG/DL
GLUCOSE SERPL-MCNC: 103 MG/DL
HBA1C MFR BLD HPLC: 6.1 %
HCT VFR BLD CALC: 44.2 %
HDLC SERPL-MCNC: 67 MG/DL
HGB BLD-MCNC: 14.4 G/DL
IMM GRANULOCYTES NFR BLD AUTO: 0.5 %
LDLC SERPL CALC-MCNC: 96 MG/DL
LYMPHOCYTES # BLD AUTO: 1.53 K/UL
LYMPHOCYTES NFR BLD AUTO: 27.2 %
MAN DIFF?: NORMAL
MCHC RBC-ENTMCNC: 30.3 PG
MCHC RBC-ENTMCNC: 32.6 GM/DL
MCV RBC AUTO: 93.1 FL
MONOCYTES # BLD AUTO: 0.47 K/UL
MONOCYTES NFR BLD AUTO: 8.3 %
NEUTROPHILS # BLD AUTO: 3.17 K/UL
NEUTROPHILS NFR BLD AUTO: 56.4 %
PLATELET # BLD AUTO: 249 K/UL
POTASSIUM SERPL-SCNC: 4.6 MMOL/L
PROT SERPL-MCNC: 7.3 G/DL
RBC # BLD: 4.75 M/UL
RBC # FLD: 12.5 %
SODIUM SERPL-SCNC: 139 MMOL/L
TRIGL SERPL-MCNC: 95 MG/DL
WBC # FLD AUTO: 5.63 K/UL

## 2020-02-14 ENCOUNTER — APPOINTMENT (OUTPATIENT)
Dept: COLORECTAL SURGERY | Facility: CLINIC | Age: 75
End: 2020-02-14

## 2020-03-18 ENCOUNTER — RX RENEWAL (OUTPATIENT)
Age: 75
End: 2020-03-18

## 2020-06-12 ENCOUNTER — RX RENEWAL (OUTPATIENT)
Age: 75
End: 2020-06-12

## 2020-07-29 ENCOUNTER — APPOINTMENT (OUTPATIENT)
Dept: CARDIOLOGY | Facility: CLINIC | Age: 75
End: 2020-07-29

## 2020-07-30 ENCOUNTER — APPOINTMENT (OUTPATIENT)
Dept: OTHER | Facility: CLINIC | Age: 75
End: 2020-07-30
Payer: COMMERCIAL

## 2020-07-30 PROCEDURE — 99396 PREV VISIT EST AGE 40-64: CPT | Mod: 95

## 2020-07-30 PROCEDURE — 99442: CPT | Mod: 95

## 2020-08-03 RX ORDER — GABAPENTIN 300 MG/1
300 CAPSULE ORAL
Qty: 30 | Refills: 0 | Status: ACTIVE | COMMUNITY
Start: 2020-06-04

## 2020-08-03 RX ORDER — TIZANIDINE 2 MG/1
2 TABLET ORAL
Qty: 30 | Refills: 0 | Status: ACTIVE | COMMUNITY
Start: 2020-06-04

## 2020-08-03 RX ORDER — DOXAZOSIN 4 MG/1
4 TABLET ORAL
Qty: 180 | Refills: 0 | Status: ACTIVE | COMMUNITY
Start: 2020-04-01

## 2020-08-03 RX ORDER — EZETIMIBE 10 MG/1
10 TABLET ORAL
Qty: 90 | Refills: 0 | Status: ACTIVE | COMMUNITY
Start: 2020-07-22

## 2020-08-03 NOTE — ASSESSMENT
[FreeTextEntry1] : rhinitis chronic - all meds and nasal  sprays renewed \par  continue Singulair and Allegra \par gerd - cont famotidine \par \par

## 2020-08-03 NOTE — HEALTH RISK ASSESSMENT
[Patient reported colonoscopy was normal] : Patient reported colonoscopy was normal [ColonoscopyDate] : 10/2019

## 2020-08-03 NOTE — HISTORY OF PRESENT ILLNESS
[Home] : at home, [unfilled] , at the time of the visit. [Other Location: e.g. Home (Enter Location, City,State)___] : at [unfilled] [Verbal consent obtained from patient] : the patient, [unfilled] [FreeTextEntry1] : pt here for follow up WTC CERTIFIFED  rhinitis and GERD \par " allergies" - nasal congestion , runny nose, itchy eyes \par  nasal sprays and eye drops are helpful with his Sx \par  tries to use saline spray most of the time, currently using Flonase daily \par Allegra as needed \par He was evaluated by ENT and recommended IT and dev septum surgery, \par He was evaluated by an eye doctor and found too have cataract\par c/o itchy watery eyes- using Patanol eye drops with improvement \par \par has tried Beconase, Nasonex and Flonase in the past, feels that these work similarly; tried antihistamines in the past which he felt worked well but stopped these "because of my prostate"\par \par he stated that he does snore and was diagnosed with EVERTON shortly after 09 11 2001 but WAS unable to tolerate the CPAP mask \par he stated that he had " walking pneumonia " and was teated with Abx 02 2018 \par  has no cough, wheezing or SOB at this time\par reported that was vaccinated for Pneumonia in the past \par Exp hx: worked at [employer] 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 \par 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[employer]; 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 \par He denies any exertional chest pains or shortness of breath, though he does not exert himself much of his knee pain.\par  hyperlipidemia and Hx rheumatic fever \par \par \par he also c/o R plants pain - plantar fasciitis \par  stated that had febrile illness in march 2020\par  NP swab was neg for covid but had post Ab test

## 2020-08-03 NOTE — HISTORY OF PRESENT ILLNESS
[Home] : at home, [unfilled] , at the time of the visit. [Other Location: e.g. Home (Enter Location, City,State)___] : at [unfilled] [Verbal consent obtained from patient] : the patient, [unfilled] [FreeTextEntry1] : pt here for follow up [facility] CERTIFIFED  rhinitis and GERD \par " allergies" - nasal congestion , runny nose, itchy eyes \par  nasal sprays and eye drops are helpful with his Sx \par  tries to use saline spray most of the time, currently using Flonase daily \par Allegra as needed \par He was evaluated by ENT and recommended IT and dev septum surgery, \par He was evaluated by an eye doctor and found too have cataract\par c/o itchy watery eyes- using Patanol eye drops with improvement \par \par has tried Beconase, Nasonex and Flonase in the past, feels that these work similarly; tried antihistamines in the past which he felt worked well but stopped these "because of my prostate"\par \par he stated that he does snore and was diagnosed with EVERTON shortly after 09 11 2001 but WAS unable to tolerate the CPAP mask \par he stated that he had " walking pneumonia " and was teated with Abx 02 2018 \par  has no cough, wheezing or SOB at this time\par reported that was vaccinated for Pneumonia in the past \par Exp hx: worked at "[employer] 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 \par 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[employer]; 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 \par He denies any exertional chest pains or shortness of breath, though he does not exert himself much of his knee pain.\par  hyperlipidemia and Hx rheumatic fever \par \par \par he also c/o R plants pain - plantar fasciitis \par  stated that had febrile illness in march 2020\par  NP swab was neg for covid but had post Ab test

## 2020-08-03 NOTE — DISCUSSION/SUMMARY
[Home] : at home, [unfilled] , at the time of the visit. [Other Location: e.g. Home (Enter Location, City,State)___] : at [unfilled] [Verbal consent obtained from patient] : the patient, [unfilled] [FreeTextEntry3] : see follow up OCc MEd note for details  [Patient seen for WTC Monitoring ___] : Patient was seen for WTC monitoring [unfilled] [Please See Note in Chart and Documentation in Trial DB] : Please see note in chart and documentation in Trial DB.

## 2020-11-16 ENCOUNTER — RX RENEWAL (OUTPATIENT)
Age: 75
End: 2020-11-16

## 2021-06-07 ENCOUNTER — RX RENEWAL (OUTPATIENT)
Age: 76
End: 2021-06-07

## 2021-06-08 ENCOUNTER — RX RENEWAL (OUTPATIENT)
Age: 76
End: 2021-06-08

## 2021-08-10 ENCOUNTER — RX RENEWAL (OUTPATIENT)
Age: 76
End: 2021-08-10

## 2021-08-26 ENCOUNTER — APPOINTMENT (OUTPATIENT)
Dept: OTHER | Facility: CLINIC | Age: 76
End: 2021-08-26
Payer: COMMERCIAL

## 2021-08-26 VITALS
SYSTOLIC BLOOD PRESSURE: 122 MMHG | BODY MASS INDEX: 26.12 KG/M2 | DIASTOLIC BLOOD PRESSURE: 73 MMHG | HEART RATE: 85 BPM | OXYGEN SATURATION: 96 % | WEIGHT: 153 LBS | HEIGHT: 64 IN | RESPIRATION RATE: 16 BRPM | TEMPERATURE: 99.2 F

## 2021-08-26 PROCEDURE — 99397 PER PM REEVAL EST PAT 65+ YR: CPT | Mod: 25

## 2021-08-26 PROCEDURE — 99212 OFFICE O/P EST SF 10 MIN: CPT | Mod: 25

## 2021-08-26 RX ORDER — SODIUM SULFATE, POTASSIUM SULFATE, MAGNESIUM SULFATE 17.5; 3.13; 1.6 G/ML; G/ML; G/ML
17.5-3.13-1.6 SOLUTION, CONCENTRATE ORAL
Qty: 1 | Refills: 3 | Status: DISCONTINUED | COMMUNITY
Start: 2019-10-15 | End: 2021-08-26

## 2021-08-26 NOTE — REASON FOR VISIT
[Follow-Up] : a follow-up visit [FreeTextEntry1] :  follow up [facility] CERTIFIED  rhinitis and GERD

## 2021-08-26 NOTE — PAST MEDICAL HISTORY
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

## 2021-08-26 NOTE — HISTORY OF PRESENT ILLNESS
[FreeTextEntry1] :  \par " allergies" - nasal congestion , runny nose, itchy eyes \par  nasal sprays and eye drops are helpful with his Sx \par  tries to use saline spray most of the time, currently using Flonase daily \par Allegra as needed \par He was evaluated by ENT and recommended IT and dev septum surgery, \par \par c/o itchy watery eyes- using Patanol eye drops with improvement \par \par has tried Beconase, Nasonex and Flonase in the past, feels that these work similarly; tried antihistamines in the past which he felt worked well but stopped these "because of my prostate"\par \par he stated that he does snore and was diagnosed with EVERTON shortly after 09 11 2001 but WAS unable to tolerate the CPAP mask \par he stated that he had " walking pneumonia " and was teated with Abx 02 2018 \par  has no cough, wheezing or SOB at this time\par reported that was vaccinated for Pneumonia in the past \par \par Soc hx: 2 kids in college, wife works as an exporter for pharmaceuticals \par \par \par Colonoscopy 10/ 2019 nl COLONOSCOPY \par had cardiac w/up BY  Dr LIsker for Cardiac Calcification LMCA, managed medically.\par \par cylindrical bronchiectasis on CT chest 2016\par he stated that was evaluated by Pulmonologist in the past for this at [facility] \par has no Sx of pulm disease \par \par cxr 2019 nAPD - declined referral today \par  And he has severe arthritis in his left knee with a torn meniscus that WAS  repaired 5/2019. \par He denies any exertional chest pains or shortness of breath, though he does not exert himself much of his knee pain.\par hx  hyperlipidemia and Hx rheumatic fever \par \par \par  stated that had febrile illness in march 2020\par  NP swab was neg for covid but had post Ab test

## 2021-08-26 NOTE — PAST MEDICAL HISTORY
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

## 2021-08-26 NOTE — ASSESSMENT
[FreeTextEntry1] : rhinitis chronic - all meds and nasal  sprays renewed \par  continue Singulair and Allegra \par gerd - cont famotidine \par \par GERD control with diet, weight loss and life style changes discussed \par \par  Foods and drinks that have been commonly linked to GERD symptoms include;\par \par •acidic foods, such as citrus fruits and tomatoes\par •alcoholic drinks\par •chocolate\par •coffee and other sources of caffeine\par •high-fat foods\par •mint\par •spicy foods\par Carbonated beverages \par Lifestyle Changes\par \par Avoid lying down for at least two hours after a meal or after drinking acidic beverages, like soda, or other caffeinated beverages. This can help to prevent stomach contents from flowing back into the esophagus. \par Keep your head elevated while you sleep. Using an extra pillow or two can also help to prevent reflux. \par Eat smaller and more frequent meals each day instead of a few large meals. This promotes digestion and can aid in preventing heartburn. \par Wear loose-fitting clothes to ease pressure on the stomach, which can worsen heartburn and reflux.\par Quit smoking. Smoking can increase the production of stomach acid and reduce the function of the lower esophageal sphincter, the muscle that keeps acid and other stomach content from reentering the esophagus. Smoking can also decrease the amount of saliva, which neutralizes acid produced by the body. \par Reduce excess weight around the midsection. This can ease pressure on the stomach. Such pressure can force some stomach contents back up the esophagus\par \par

## 2021-08-26 NOTE — HISTORY OF PRESENT ILLNESS
[FreeTextEntry1] :  \par " allergies" - nasal congestion , runny nose, itchy eyes \par  nasal sprays and eye drops are helpful with his Sx \par  tries to use saline spray most of the time, currently using Flonase daily \par Allegra as needed \par He was evaluated by ENT and recommended IT and dev septum surgery, \par \par c/o itchy watery eyes- using Patanol eye drops with improvement \par \par has tried Beconase, Nasonex and Flonase in the past, feels that these work similarly; tried antihistamines in the past which he felt worked well but stopped these "because of my prostate"\par \par he stated that he does snore and was diagnosed with EVERTON shortly after 09 11 2001 but WAS unable to tolerate the CPAP mask \par he stated that he had " walking pneumonia " and was teated with Abx 02 2018 \par  has no cough, wheezing or SOB at this time\par reported that was vaccinated for Pneumonia in the past \par \par Soc hx: 2 kids in college, wife works as an exporter for pharmaceuticals \par \par \par Colonoscopy 10/ 2019 nl COLONOSCOPY \par had cardiac w/up BY  Dr LIsker for Cardiac Calcification LMCA, managed medically.\par \par cylindrical bronchiectasis on CT chest 2016\par he stated that was evaluated by Pulmonologist in the past for this at [facility] \par has no Sx of pulm disease \par \par cxr 2019 nAPD - declined referral today \par  And he has severe arthritis in his left knee with a torn meniscus that WAS  repaired 5/2019. \par He denies any exertional chest pains or shortness of breath, though he does not exert himself much of his knee pain.\par hx  hyperlipidemia and Hx rheumatic fever \par \par \par  stated that had febrile illness in march 2020\par  NP swab was neg for covid but had post Ab test

## 2021-08-26 NOTE — PHYSICAL EXAM
[General Appearance - In No Acute Distress] : in no acute distress [General Appearance - Alert] : alert [Auscultation Breath Sounds / Voice Sounds] : lungs were clear to auscultation bilaterally [Heart Rate And Rhythm] : heart rate was normal and rhythm regular [Heart Sounds] : normal S1 and S2 [Heart Sounds Gallop] : no gallops [Murmurs] : no murmurs [Bowel Sounds] : normal bowel sounds [Heart Sounds Pericardial Friction Rub] : no pericardial rub [Abdomen Soft] : soft [Abdomen Tenderness] : non-tender [] : no hepato-splenomegaly [Abdomen Mass (___ Cm)] : no abdominal mass palpated

## 2021-08-26 NOTE — REASON FOR VISIT
[Follow-Up] : a follow-up visit [FreeTextEntry1] :  follow up [facility] CERTIFIED  rhinitis and GERD

## 2021-08-26 NOTE — DISCUSSION/SUMMARY
[Patient seen for WTC Monitoring ___] : Patient was seen for WTC monitoring [unfilled] [Please See Note in Chart and Documentation in Trial DB] : Please see note in chart and documentation in Trial DB. [FreeTextEntry3] : see follow up OC[facility] MEd note for details

## 2021-08-27 LAB
ALBUMIN SERPL ELPH-MCNC: 4.5 G/DL
ALP BLD-CCNC: 89 U/L
ALT SERPL-CCNC: 17 U/L
ANION GAP SERPL CALC-SCNC: 16 MMOL/L
APPEARANCE: CLEAR
AST SERPL-CCNC: 22 U/L
BACTERIA: NEGATIVE
BASOPHILS # BLD AUTO: 0.04 K/UL
BASOPHILS NFR BLD AUTO: 0.7 %
BILIRUB SERPL-MCNC: 0.8 MG/DL
BILIRUBIN URINE: NEGATIVE
BLOOD URINE: NEGATIVE
BUN SERPL-MCNC: 13 MG/DL
CALCIUM SERPL-MCNC: 9.7 MG/DL
CHLORIDE SERPL-SCNC: 99 MMOL/L
CHOLEST SERPL-MCNC: 168 MG/DL
CO2 SERPL-SCNC: 23 MMOL/L
COLOR: YELLOW
CREAT SERPL-MCNC: 0.9 MG/DL
EOSINOPHIL # BLD AUTO: 0.34 K/UL
EOSINOPHIL NFR BLD AUTO: 6.2 %
GLUCOSE QUALITATIVE U: NEGATIVE
GLUCOSE SERPL-MCNC: 168 MG/DL
HCT VFR BLD CALC: 45.2 %
HDLC SERPL-MCNC: 63 MG/DL
HGB BLD-MCNC: 14.4 G/DL
HYALINE CASTS: 1 /LPF
IMM GRANULOCYTES NFR BLD AUTO: 0.4 %
KETONES URINE: NEGATIVE
LDLC SERPL CALC-MCNC: 84 MG/DL
LEUKOCYTE ESTERASE URINE: NEGATIVE
LYMPHOCYTES # BLD AUTO: 1.32 K/UL
LYMPHOCYTES NFR BLD AUTO: 24 %
MAN DIFF?: NORMAL
MCHC RBC-ENTMCNC: 31 PG
MCHC RBC-ENTMCNC: 31.9 GM/DL
MCV RBC AUTO: 97.2 FL
MICROSCOPIC-UA: NORMAL
MONOCYTES # BLD AUTO: 0.47 K/UL
MONOCYTES NFR BLD AUTO: 8.5 %
NEUTROPHILS # BLD AUTO: 3.31 K/UL
NEUTROPHILS NFR BLD AUTO: 60.2 %
NITRITE URINE: NEGATIVE
NONHDLC SERPL-MCNC: 106 MG/DL
PH URINE: 6.5
PLATELET # BLD AUTO: 249 K/UL
POTASSIUM SERPL-SCNC: 4.7 MMOL/L
PROT SERPL-MCNC: 7.1 G/DL
PROTEIN URINE: NORMAL
RBC # BLD: 4.65 M/UL
RBC # FLD: 13.3 %
RED BLOOD CELLS URINE: 2 /HPF
SODIUM SERPL-SCNC: 137 MMOL/L
SPECIFIC GRAVITY URINE: 1.02
SQUAMOUS EPITHELIAL CELLS: 0 /HPF
TRIGL SERPL-MCNC: 112 MG/DL
UROBILINOGEN URINE: NORMAL
WBC # FLD AUTO: 5.5 K/UL
WHITE BLOOD CELLS URINE: 1 /HPF

## 2022-02-01 ENCOUNTER — RX RENEWAL (OUTPATIENT)
Age: 77
End: 2022-02-01

## 2022-02-02 ENCOUNTER — RX RENEWAL (OUTPATIENT)
Age: 77
End: 2022-02-02

## 2022-05-23 ENCOUNTER — RX RENEWAL (OUTPATIENT)
Age: 77
End: 2022-05-23

## 2022-07-15 ENCOUNTER — RX RENEWAL (OUTPATIENT)
Age: 77
End: 2022-07-15

## 2022-08-09 ENCOUNTER — APPOINTMENT (OUTPATIENT)
Dept: OTHER | Facility: CLINIC | Age: 77
End: 2022-08-09

## 2022-08-09 VITALS
HEART RATE: 97 BPM | OXYGEN SATURATION: 97 % | HEIGHT: 64 IN | BODY MASS INDEX: 26.63 KG/M2 | DIASTOLIC BLOOD PRESSURE: 89 MMHG | TEMPERATURE: 98.4 F | WEIGHT: 156 LBS | SYSTOLIC BLOOD PRESSURE: 149 MMHG

## 2022-08-09 PROCEDURE — 99397 PER PM REEVAL EST PAT 65+ YR: CPT | Mod: 25

## 2022-08-09 PROCEDURE — 99213 OFFICE O/P EST LOW 20 MIN: CPT | Mod: 25

## 2022-08-09 RX ORDER — CHLORHEXIDINE GLUCONATE, 0.12% ORAL RINSE 1.2 MG/ML
0.12 SOLUTION DENTAL
Qty: 473 | Refills: 0 | Status: ACTIVE | COMMUNITY
Start: 2022-05-26

## 2022-08-09 RX ORDER — LEVOFLOXACIN 500 MG/1
500 TABLET, FILM COATED ORAL
Qty: 3 | Refills: 0 | Status: COMPLETED | COMMUNITY
Start: 2022-08-02

## 2022-08-09 RX ORDER — DULOXETINE HYDROCHLORIDE 20 MG/1
20 CAPSULE, DELAYED RELEASE PELLETS ORAL
Qty: 30 | Refills: 0 | Status: ACTIVE | COMMUNITY
Start: 2022-02-22

## 2022-08-09 RX ORDER — GABAPENTIN 100 MG/1
100 CAPSULE ORAL
Qty: 30 | Refills: 0 | Status: ACTIVE | COMMUNITY
Start: 2022-05-20

## 2022-08-09 NOTE — HISTORY OF PRESENT ILLNESS
[FreeTextEntry1] :  pt stated that he has elevated PSA and abnormal prostate MRI findings \par he is scheduled to have prostate Bx later this month \par \par " allergies" - nasal congestion , runny nose, itchy eyes \par  nasal sprays and eye drops are helpful with his Sx \par  tries to use saline spray most of the time, currently using Flonase- Azelastine nasal spray  daily \par Allegra as needed \par He was evaluated by ENT in the past and recommended IT and dev septum surgery, \par \par c/o itchy watery eyes- using Patanol eye drops with improvement \par \par \par \par he stated that he does snore and was diagnosed with EVERTON shortly after 09 11 2001 but WAS unable to tolerate the CPAP mask \par he stated that he had " walking pneumonia " and was teated with Abx 02 2018 \par  has no cough, wheezing or SOB at this time\par reported that was vaccinated for Pneumonia in the past \par \par Soc hx: 2 kids i, wife works as an exporter for pharmaceuticals \par \par \par Colonoscopy 10/ 2019 nl COLONOSCOPY \par had cardiac w/up BY  Dr LIsker for Cardiac Calcification LMCA, managed medically.\par \par cylindrical bronchiectasis on CT chest 2016\par he stated that was evaluated by Pulmonologist in the past for this at [facility] \par has no Sx of pulm disease \par \par \par \par  And he has severe arthritis in his left knee with a torn meniscus that WAS  repaired 5/2019. \par He denies any exertional chest pains or shortness of breath, though he does not exert himself much of his knee pain.\par hx  hyperlipidemia and Hx rheumatic fever \par \par \par

## 2022-08-09 NOTE — PAST MEDICAL HISTORY
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

## 2022-08-09 NOTE — PAST MEDICAL HISTORY
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

## 2022-08-09 NOTE — REASON FOR VISIT
[Follow-Up] : a follow-up visit [FreeTextEntry1] :  follow up [facility] CERTIFIED  rhinitis and GERD

## 2022-08-09 NOTE — ASSESSMENT
[FreeTextEntry1] : Rhinitis Chronic - all meds and nasal  sprays renewed \par  continue Singulair and Allegra \par gerd - cont famotidine \par \par GERD control with diet, weight loss and life style changes discussed \par \par  Foods and drinks that have been commonly linked to GERD symptoms include;\par \par •acidic foods, such as citrus fruits and tomatoes\par •alcoholic drinks\par •chocolate\par •coffee and other sources of caffeine\par •high-fat foods\par •mint\par •spicy foods\par Carbonated beverages \par Lifestyle Changes\par \par Avoid lying down for at least two hours after a meal or after drinking acidic beverages, like soda, or other caffeinated beverages. This can help to prevent stomach contents from flowing back into the esophagus. \par Keep your head elevated while you sleep. Using an extra pillow or two can also help to prevent reflux. \par Eat smaller and more frequent meals each day instead of a few large meals. This promotes digestion and can aid in preventing heartburn. \par Wear loose-fitting clothes to ease pressure on the stomach, which can worsen heartburn and reflux.\par Quit smoking. Smoking can increase the production of stomach acid and reduce the function of the lower esophageal sphincter, the muscle that keeps acid and other stomach content from reentering the esophagus. Smoking can also decrease the amount of saliva, which neutralizes acid produced by the body. \par Reduce excess weight around the midsection. This can ease pressure on the stomach. Such pressure can force some stomach contents back up the esophagus\par pt undergoing w/up for elevated PSA and abnormal prostate MRI \par \par stated that is scheduled to have Bx at prostate at the end of 08 2022\par  i instructed him to forward Bx results to me

## 2022-08-09 NOTE — HISTORY OF PRESENT ILLNESS
[FreeTextEntry1] :  pt stated that he has elevated PSA and abnormal prostate MRI findings \par he is scheduled to have prostate Bx later this month \par \par " allergies" - nasal congestion , runny nose, itchy eyes \par  nasal sprays and eye drops are helpful with his Sx \par  tries to use saline spray most of the time, currently using Flonase- Azelastine nasal spray  daily \par Allegra as needed \par He was evaluated by ENT in the past and recommended IT and dev septum surgery, \par \par c/o itchy watery eyes- using Patanol eye drops with improvement \par \par \par \par he stated that he does snore and was diagnosed with EVERTON shortly after 09 11 2001 but WAS unable to tolerate the CPAP mask \par he stated that he had " walking pneumonia " and was teated with Abx 02 2018 \par  has no cough, wheezing or SOB at this time\par reported that was vaccinated for Pneumonia in the past \par \par Soc hx: 2 kids i, wife works as an exporter for pharmaceuticals \par \par \par Colonoscopy 10/ 2019 nl COLONOSCOPY \par had cardiac w/up BY  Dr LIsker for Cardiac Calcification LMCA, managed medically.\par \par cylindrical bronchiectasis on CT chest 2016\par he stated that was evaluated by Pulmonologist in the past for this at [facility] \par has no Sx of pulm disease \par \par \par \par  And he has severe arthritis in his left knee with a torn meniscus that WAS  repaired 5/2019. \par He denies any exertional chest pains or shortness of breath, though he does not exert himself much of his knee pain.\par hx  hyperlipidemia and Hx rheumatic fever \par \par \par

## 2022-08-10 LAB
ALBUMIN SERPL ELPH-MCNC: 4.7 G/DL
ALP BLD-CCNC: 80 U/L
ALT SERPL-CCNC: 16 U/L
ANION GAP SERPL CALC-SCNC: 11 MMOL/L
APPEARANCE: CLEAR
AST SERPL-CCNC: 24 U/L
BACTERIA: NEGATIVE
BASOPHILS # BLD AUTO: 0.04 K/UL
BASOPHILS NFR BLD AUTO: 0.7 %
BILIRUB SERPL-MCNC: 0.6 MG/DL
BILIRUBIN URINE: NEGATIVE
BLOOD URINE: NEGATIVE
BUN SERPL-MCNC: 11 MG/DL
CALCIUM SERPL-MCNC: 9.4 MG/DL
CHLORIDE SERPL-SCNC: 105 MMOL/L
CHOLEST SERPL-MCNC: 213 MG/DL
CO2 SERPL-SCNC: 25 MMOL/L
COLOR: YELLOW
CREAT SERPL-MCNC: 0.85 MG/DL
EGFR: 91 ML/MIN/1.73M2
EOSINOPHIL # BLD AUTO: 0.29 K/UL
EOSINOPHIL NFR BLD AUTO: 4.9 %
GLUCOSE QUALITATIVE U: NEGATIVE
GLUCOSE SERPL-MCNC: 95 MG/DL
HCT VFR BLD CALC: 44.2 %
HDLC SERPL-MCNC: 53 MG/DL
HGB BLD-MCNC: 14.8 G/DL
HYALINE CASTS: 1 /LPF
IMM GRANULOCYTES NFR BLD AUTO: 0.3 %
KETONES URINE: NEGATIVE
LDLC SERPL CALC-MCNC: 111 MG/DL
LEUKOCYTE ESTERASE URINE: ABNORMAL
LYMPHOCYTES # BLD AUTO: 1.72 K/UL
LYMPHOCYTES NFR BLD AUTO: 28.9 %
MAN DIFF?: NORMAL
MCHC RBC-ENTMCNC: 31.4 PG
MCHC RBC-ENTMCNC: 33.5 GM/DL
MCV RBC AUTO: 93.6 FL
MICROSCOPIC-UA: NORMAL
MONOCYTES # BLD AUTO: 0.52 K/UL
MONOCYTES NFR BLD AUTO: 8.7 %
NEUTROPHILS # BLD AUTO: 3.37 K/UL
NEUTROPHILS NFR BLD AUTO: 56.5 %
NITRITE URINE: NEGATIVE
NONHDLC SERPL-MCNC: 160 MG/DL
PH URINE: 6
PLATELET # BLD AUTO: 244 K/UL
POTASSIUM SERPL-SCNC: 4.2 MMOL/L
PROT SERPL-MCNC: 7.2 G/DL
PROTEIN URINE: ABNORMAL
RBC # BLD: 4.72 M/UL
RBC # FLD: 13.2 %
RED BLOOD CELLS URINE: 4 /HPF
SODIUM SERPL-SCNC: 141 MMOL/L
SPECIFIC GRAVITY URINE: 1.02
SQUAMOUS EPITHELIAL CELLS: 1 /HPF
TRIGL SERPL-MCNC: 248 MG/DL
UROBILINOGEN URINE: NORMAL
WBC # FLD AUTO: 5.96 K/UL
WHITE BLOOD CELLS URINE: 37 /HPF

## 2022-10-10 ENCOUNTER — RX RENEWAL (OUTPATIENT)
Age: 77
End: 2022-10-10

## 2022-12-12 ENCOUNTER — RX RENEWAL (OUTPATIENT)
Age: 77
End: 2022-12-12

## 2023-04-17 ENCOUNTER — RX RENEWAL (OUTPATIENT)
Age: 78
End: 2023-04-17

## 2023-07-03 ENCOUNTER — RX RENEWAL (OUTPATIENT)
Age: 78
End: 2023-07-03

## 2023-09-05 ENCOUNTER — RX RENEWAL (OUTPATIENT)
Age: 78
End: 2023-09-05

## 2023-09-25 ENCOUNTER — APPOINTMENT (OUTPATIENT)
Dept: OTHER | Facility: CLINIC | Age: 78
End: 2023-09-25

## 2023-10-16 ENCOUNTER — APPOINTMENT (OUTPATIENT)
Dept: OTHER | Facility: CLINIC | Age: 78
End: 2023-10-16
Payer: COMMERCIAL

## 2023-10-16 VITALS
WEIGHT: 154 LBS | DIASTOLIC BLOOD PRESSURE: 70 MMHG | SYSTOLIC BLOOD PRESSURE: 118 MMHG | HEIGHT: 63 IN | OXYGEN SATURATION: 97 % | TEMPERATURE: 98 F | HEART RATE: 73 BPM | BODY MASS INDEX: 27.29 KG/M2

## 2023-10-16 DIAGNOSIS — Z04.9 ENCOUNTER FOR EXAMINATION AND OBSERVATION FOR UNSPECIFIED REASON: ICD-10-CM

## 2023-10-16 DIAGNOSIS — K21.9 GASTRO-ESOPHAGEAL REFLUX DISEASE W/OUT ESOPHAGITIS: ICD-10-CM

## 2023-10-16 DIAGNOSIS — Z12.9 ENCOUNTER FOR SCREENING FOR MALIGNANT NEOPLASM, SITE UNSPECIFIED: ICD-10-CM

## 2023-10-16 PROCEDURE — 99214 OFFICE O/P EST MOD 30 MIN: CPT | Mod: 25

## 2023-10-16 PROCEDURE — 99397 PER PM REEVAL EST PAT 65+ YR: CPT | Mod: 25

## 2023-10-16 RX ORDER — FEXOFENADINE HYDROCHLORIDE 180 MG/1
180 TABLET ORAL
Qty: 30 | Refills: 9 | Status: ACTIVE | COMMUNITY
Start: 2018-07-30 | End: 1900-01-01

## 2023-10-17 LAB
ALBUMIN SERPL ELPH-MCNC: 4.8 G/DL
ALP BLD-CCNC: 96 U/L
ALT SERPL-CCNC: 20 U/L
ANION GAP SERPL CALC-SCNC: 15 MMOL/L
APPEARANCE: CLEAR
AST SERPL-CCNC: 25 U/L
BACTERIA: NEGATIVE /HPF
BILIRUB SERPL-MCNC: 0.6 MG/DL
BILIRUBIN URINE: NEGATIVE
BLOOD URINE: NEGATIVE
BUN SERPL-MCNC: 16 MG/DL
CALCIUM SERPL-MCNC: 9.5 MG/DL
CAST: 0 /LPF
CHLORIDE SERPL-SCNC: 103 MMOL/L
CHOLEST SERPL-MCNC: 170 MG/DL
CO2 SERPL-SCNC: 21 MMOL/L
COLOR: YELLOW
CREAT SERPL-MCNC: 0.72 MG/DL
EGFR: 95 ML/MIN/1.73M2
EPITHELIAL CELLS: 0 /HPF
GLUCOSE QUALITATIVE U: NEGATIVE MG/DL
GLUCOSE SERPL-MCNC: 107 MG/DL
HCT VFR BLD CALC: 44.2 %
HDLC SERPL-MCNC: 59 MG/DL
HGB BLD-MCNC: 14.8 G/DL
KETONES URINE: NEGATIVE MG/DL
LDLC SERPL CALC-MCNC: 86 MG/DL
LEUKOCYTE ESTERASE URINE: ABNORMAL
MCHC RBC-ENTMCNC: 30.7 PG
MCHC RBC-ENTMCNC: 33.5 GM/DL
MCV RBC AUTO: 91.7 FL
MICROSCOPIC-UA: NORMAL
NITRITE URINE: NEGATIVE
NONHDLC SERPL-MCNC: 110 MG/DL
PH URINE: 6
PLATELET # BLD AUTO: 250 K/UL
POTASSIUM SERPL-SCNC: 4.2 MMOL/L
PROT SERPL-MCNC: 7.5 G/DL
PROTEIN URINE: NEGATIVE MG/DL
PSA SERPL-MCNC: 2.7 NG/ML
RBC # BLD: 4.82 M/UL
RBC # FLD: 13 %
RED BLOOD CELLS URINE: 0 /HPF
SODIUM SERPL-SCNC: 138 MMOL/L
SPECIFIC GRAVITY URINE: 1.01
TRIGL SERPL-MCNC: 139 MG/DL
UROBILINOGEN URINE: 0.2 MG/DL
WBC # FLD AUTO: 6.1 K/UL
WHITE BLOOD CELLS URINE: 2 /HPF

## 2023-11-03 ENCOUNTER — NON-APPOINTMENT (OUTPATIENT)
Age: 78
End: 2023-11-03

## 2023-12-11 ENCOUNTER — NON-APPOINTMENT (OUTPATIENT)
Age: 78
End: 2023-12-11

## 2024-01-31 ENCOUNTER — LABORATORY RESULT (OUTPATIENT)
Age: 79
End: 2024-01-31

## 2024-01-31 ENCOUNTER — APPOINTMENT (OUTPATIENT)
Dept: PULMONOLOGY | Facility: CLINIC | Age: 79
End: 2024-01-31
Payer: MEDICARE

## 2024-01-31 VITALS
BODY MASS INDEX: 27.29 KG/M2 | HEIGHT: 63 IN | TEMPERATURE: 97.6 F | SYSTOLIC BLOOD PRESSURE: 136 MMHG | OXYGEN SATURATION: 94 % | RESPIRATION RATE: 15 BRPM | WEIGHT: 154 LBS | HEART RATE: 71 BPM | DIASTOLIC BLOOD PRESSURE: 80 MMHG

## 2024-01-31 PROCEDURE — 99204 OFFICE O/P NEW MOD 45 MIN: CPT

## 2024-01-31 NOTE — PHYSICAL EXAM
[No Acute Distress] : no acute distress [Normal Oropharynx] : normal oropharynx [Normal Appearance] : normal appearance [No Neck Mass] : no neck mass [Normal Rate/Rhythm] : normal rate/rhythm [Normal S1, S2] : normal s1, s2 [No Murmurs] : no murmurs [No Resp Distress] : no resp distress [No Abnormalities] : no abnormalities [Benign] : benign [Normal Gait] : normal gait [No Clubbing] : no clubbing [No Cyanosis] : no cyanosis [No Edema] : no edema [FROM] : FROM [Normal Color/ Pigmentation] : normal color/ pigmentation [No Focal Deficits] : no focal deficits [Oriented x3] : oriented x3 [Normal Affect] : normal affect [TextBox_68] : Fine bibasilar crackles

## 2024-01-31 NOTE — END OF VISIT
[] : Fellow [FreeTextEntry3] : agree with above active, without pulm sx, except cough with phlegm - more likely upper airway than ILD PFT, Labs, f/u based on results

## 2024-01-31 NOTE — ASSESSMENT
[FreeTextEntry1] : 78 year old [facility] patient with hx chronic rhinosinusitis on nasal sprays and montelukast, chronic hoarseness, EVERTON  (does not tolerate CPAP), who presents for abnormal CT Chest and chronic productive cough.   # ILD - Mild peripheral reticulation raises concern for early onset ILD  - Known history of dry eyes and dry mouth raise concern for sjogrens - Full ILD work up ordered -need PFT  # Rhinosinusities - c/w dymista and montelukast - Check CBC w/ diff for eos  - Check IGE - Suspect much of his coughing and sputum production are related to his chronic rhinosinusitis.  - Pt to follow with ENT.

## 2024-01-31 NOTE — HISTORY OF PRESENT ILLNESS
[Never] : never [TextBox_4] : 78 year old [facility] patient with hx chronic rhinosinusitis on nasal sprays and montelukast, chronic hoarseness, EVERTON  (does not tolerate CPAP),   Pt reports "trace of asthma/bronchitis" in the past. was taking ?anoro ellipta last year but then was taken off of it. Pt reports that he felt that the inhaler helped while he was taking it.   Pt reports shortness of breath with exertion over the last year - especially going up and down stairs. Can walk about 1 mile without having to stop. ET 3-4 flights of stairs.   He reports chronic cough with sputum production hat has worsened over the last year. Worse with laying down and in the morning.   Now sent in from [facility] program for evaluation of abnormal CT Chest.  Last PFT 2019 - Spirometry with slightly decreased FEV1 and FVC  has arthritis in his joints with some morning stiffness for 1 hour. + dry eyes + dry mouth. Seeing optho for dry eyes - taking artifical eye drops. + GERD

## 2024-02-10 LAB
A ALTERNATA IGE QN: <0.1 KUA/L
A FUMIGATUS IGE QN: <0.1 KUA/L
ALBUMIN SERPL ELPH-MCNC: 5.2 G/DL
ALDOLASE SERPL-CCNC: 5.4 U/L
ALP BLD-CCNC: 93 U/L
ALT SERPL-CCNC: 20 U/L
ANA SER IF-ACNC: NEGATIVE
ANION GAP SERPL CALC-SCNC: 13 MMOL/L
AST SERPL-CCNC: 23 U/L
BASOPHILS # BLD AUTO: 0.06 K/UL
BASOPHILS NFR BLD AUTO: 0.9 %
BILIRUB SERPL-MCNC: 0.5 MG/DL
BUN SERPL-MCNC: 9 MG/DL
C ALBICANS IGE QN: <0.1 KUA/L
C HERBARUM IGE QN: <0.1 KUA/L
CALCIUM SERPL-MCNC: 10.1 MG/DL
CAT DANDER IGE QN: <0.1 KUA/L
CCP AB SER IA-ACNC: <8 UNITS
CENTROMERE IGG SER-ACNC: <0.2 AL
CHLORIDE SERPL-SCNC: 102 MMOL/L
CK SERPL-CCNC: 201 U/L
CO2 SERPL-SCNC: 26 MMOL/L
COMMON RAGWEED IGE QN: <0.1 KUA/L
CREAT SERPL-MCNC: 0.84 MG/DL
CRP SERPL-MCNC: <3 MG/L
D FARINAE IGE QN: <0.1 KUA/L
D PTERONYSS IGE QN: <0.1 KUA/L
DEPRECATED A ALTERNATA IGE RAST QL: 0 (ref 0–?)
DEPRECATED A FUMIGATUS IGE RAST QL: 0 (ref 0–?)
DEPRECATED C ALBICANS IGE RAST QL: 0
DEPRECATED C HERBARUM IGE RAST QL: 0 (ref 0–?)
DEPRECATED CAT DANDER IGE RAST QL: 0 (ref 0–?)
DEPRECATED COMMON RAGWEED IGE RAST QL: 0 (ref 0–?)
DEPRECATED D FARINAE IGE RAST QL: 0 (ref 0–?)
DEPRECATED D PTERONYSS IGE RAST QL: 0 (ref 0–?)
DEPRECATED DOG DANDER IGE RAST QL: 0 (ref 0–?)
DEPRECATED M RACEMOSUS IGE RAST QL: 0
DEPRECATED ROACH IGE RAST QL: 0 (ref 0–?)
DEPRECATED TIMOTHY IGE RAST QL: NORMAL (ref 0–?)
DEPRECATED WHITE OAK IGE RAST QL: 0 (ref 0–?)
DOG DANDER IGE QN: <0.1 KUA/L
DSDNA AB SER-ACNC: <12 IU/ML
EGFR: 90 ML/MIN/1.73M2
ENA JO1 AB SER IA-ACNC: <0.2 AL
ENA RNP AB SER IA-ACNC: <0.2 AL
ENA SCL70 IGG SER IA-ACNC: <0.2 AL
ENA SM AB SER IA-ACNC: <0.2 AL
ENA SS-A AB SER IA-ACNC: <0.2 AL
ENA SS-B AB SER IA-ACNC: <0.2 AL
EOSINOPHIL # BLD AUTO: 0.33 K/UL
EOSINOPHIL NFR BLD AUTO: 5.1 %
ERYTHROCYTE [SEDIMENTATION RATE] IN BLOOD BY WESTERGREN METHOD: 32 MM/HR
FERRITIN SERPL-MCNC: 442 NG/ML
GLUCOSE SERPL-MCNC: 91 MG/DL
HCT VFR BLD CALC: 46.8 %
HGB BLD-MCNC: 15.8 G/DL
IMM GRANULOCYTES NFR BLD AUTO: 0.5 %
LYMPHOCYTES # BLD AUTO: 2.03 K/UL
LYMPHOCYTES NFR BLD AUTO: 31.1 %
M RACEMOSUS IGE QN: <0.1 KUA/L
MAN DIFF?: NORMAL
MCHC RBC-ENTMCNC: 30.6 PG
MCHC RBC-ENTMCNC: 33.8 GM/DL
MCV RBC AUTO: 90.7 FL
MONOCYTES # BLD AUTO: 0.58 K/UL
MONOCYTES NFR BLD AUTO: 8.9 %
NEUTROPHILS # BLD AUTO: 3.49 K/UL
NEUTROPHILS NFR BLD AUTO: 53.5 %
PLATELET # BLD AUTO: 246 K/UL
POTASSIUM SERPL-SCNC: 4.5 MMOL/L
PROT SERPL-MCNC: 7.9 G/DL
RBC # BLD: 5.16 M/UL
RBC # FLD: 12.8 %
RF+CCP IGG SER-IMP: NEGATIVE
RHEUMATOID FACT SER QL: 13 IU/ML
ROACH IGE QN: <0.1 KUA/L
SODIUM SERPL-SCNC: 141 MMOL/L
STREP DNASE B TITR SER: 111 U/ML
TIMOTHY IGE QN: 0.17 KUA/L
TOTAL IGE SMQN RAST: 16 KU/L
WBC # FLD AUTO: 6.52 K/UL
WHITE OAK IGE QN: <0.1 KUA/L

## 2024-02-16 ENCOUNTER — APPOINTMENT (OUTPATIENT)
Dept: PULMONOLOGY | Facility: CLINIC | Age: 79
End: 2024-02-16
Payer: MEDICARE

## 2024-02-16 VITALS
OXYGEN SATURATION: 97 % | WEIGHT: 155 LBS | SYSTOLIC BLOOD PRESSURE: 121 MMHG | HEART RATE: 70 BPM | BODY MASS INDEX: 27.46 KG/M2 | DIASTOLIC BLOOD PRESSURE: 78 MMHG | HEIGHT: 63 IN

## 2024-02-16 PROCEDURE — 94726 PLETHYSMOGRAPHY LUNG VOLUMES: CPT

## 2024-02-16 PROCEDURE — 94060 EVALUATION OF WHEEZING: CPT

## 2024-02-16 PROCEDURE — 94729 DIFFUSING CAPACITY: CPT

## 2024-02-20 ENCOUNTER — NON-APPOINTMENT (OUTPATIENT)
Age: 79
End: 2024-02-20

## 2024-02-20 LAB
EJ AB SER QL: NEGATIVE
ENA JO1 AB SER IA-ACNC: <20 UNITS
ENA PM/SCL AB SER-ACNC: <20 UNITS
ENA SM+RNP AB SER IA-ACNC: <20 UNITS
ENA SS-A IGG SER QL: <20 UNITS
FIBRILLARIN AB SER QL: NEGATIVE
KU AB SER QL: NEGATIVE
MDA-5 (P140)(CADM-140): <20 UNITS
MI2 AB SER QL: NEGATIVE
NXP-2 (P140): <20 UNITS
OJ AB SER QL: NEGATIVE
PL12 AB SER QL: NEGATIVE
PL7 AB SER QL: NEGATIVE
SRP AB SERPL QL: NEGATIVE
TIF GAMMA (P155/140): <20 UNITS
U2 SNRNP AB SER QL: NEGATIVE

## 2024-03-11 RX ORDER — OLOPATADINE HCL 1 MG/ML
0.1 SOLUTION/ DROPS OPHTHALMIC
Qty: 1 | Refills: 1 | Status: ACTIVE | COMMUNITY
Start: 2018-07-24 | End: 1900-01-01

## 2024-03-19 ENCOUNTER — APPOINTMENT (OUTPATIENT)
Dept: PULMONOLOGY | Facility: CLINIC | Age: 79
End: 2024-03-19
Payer: MEDICARE

## 2024-03-19 VITALS
WEIGHT: 155 LBS | DIASTOLIC BLOOD PRESSURE: 75 MMHG | BODY MASS INDEX: 27.46 KG/M2 | HEART RATE: 75 BPM | RESPIRATION RATE: 15 BRPM | TEMPERATURE: 97 F | OXYGEN SATURATION: 94 % | SYSTOLIC BLOOD PRESSURE: 136 MMHG | HEIGHT: 63 IN

## 2024-03-19 DIAGNOSIS — R93.89 ABNORMAL FINDINGS ON DIAGNOSTIC IMAGING OF OTHER SPECIFIED BODY STRUCTURES: ICD-10-CM

## 2024-03-19 DIAGNOSIS — R06.09 OTHER FORMS OF DYSPNEA: ICD-10-CM

## 2024-03-19 PROCEDURE — G2211 COMPLEX E/M VISIT ADD ON: CPT

## 2024-03-19 PROCEDURE — 99215 OFFICE O/P EST HI 40 MIN: CPT

## 2024-03-19 RX ORDER — MONTELUKAST 10 MG/1
10 TABLET, FILM COATED ORAL
Qty: 1 | Refills: 11 | Status: DISCONTINUED | COMMUNITY
Start: 2019-03-06 | End: 2024-03-19

## 2024-03-19 NOTE — HISTORY OF PRESENT ILLNESS
[Never] : never [TextBox_4] : Patient is here for follow-up visit.  ILD.  78-year-old male.  Non-smoker.  .  Spends 6 months working on the [employer] [place] [employer]  CT chest in October, slow progression of ILD, compared with 2016.  No honeycombing.  Still looks mild.  PFT completely normal spirometry and volumes, mild decrease in diffusion  ILD labs were normal.  Patient is still active and working.  Notes some shortness of breath walking up the stairs of the [place] [employer] but otherwise feels well Longstanding history of chronic rhinitis and postnasal drip.,  He is on Dymista, also with nasal ipratropium.  Labs did not show eosinophilia, IgE was normal, asthma profile was negative

## 2024-03-19 NOTE — DISCUSSION/SUMMARY
[FreeTextEntry1] : ILD.  Slowly progressive and still mild.  Does not look like IPF.  Options discussed, including initiation of antifibrotic, versus watchful monitoring for symptoms, visit 6 months and pulmonary function test every 6 months.  Patient strongly prefers the latter.  He does not want to start on medications.  Will plan for follow-up with PFT in September.  I also gave him information on [facility] tp read about should he wish to

## 2024-06-14 ENCOUNTER — APPOINTMENT (OUTPATIENT)
Dept: OTHER | Facility: CLINIC | Age: 79
End: 2024-06-14
Payer: COMMERCIAL

## 2024-06-14 VITALS
HEIGHT: 63 IN | BODY MASS INDEX: 24.63 KG/M2 | SYSTOLIC BLOOD PRESSURE: 138 MMHG | RESPIRATION RATE: 15 BRPM | WEIGHT: 139 LBS | HEART RATE: 72 BPM | DIASTOLIC BLOOD PRESSURE: 78 MMHG | OXYGEN SATURATION: 97 % | TEMPERATURE: 98.3 F

## 2024-06-14 DIAGNOSIS — K21.9 GASTRO-ESOPHAGEAL REFLUX DISEASE W/OUT ESOPHAGITIS: ICD-10-CM

## 2024-06-14 DIAGNOSIS — J31.0 CHRONIC RHINITIS: ICD-10-CM

## 2024-06-14 DIAGNOSIS — J84.9 INTERSTITIAL PULMONARY DISEASE, UNSPECIFIED: ICD-10-CM

## 2024-06-14 PROCEDURE — 99214 OFFICE O/P EST MOD 30 MIN: CPT

## 2024-06-14 RX ORDER — AZELASTINE HYDROCHLORIDE AND FLUTICASONE PROPIONATE 137; 50 UG/1; UG/1
137-50 SPRAY, METERED NASAL TWICE DAILY
Qty: 1 | Refills: 11 | Status: ACTIVE | COMMUNITY
Start: 2021-08-26 | End: 1900-01-01

## 2024-06-14 RX ORDER — FAMOTIDINE 20 MG/1
20 TABLET, FILM COATED ORAL
Qty: 60 | Refills: 11 | Status: ACTIVE | COMMUNITY
Start: 2019-09-30 | End: 1900-01-01

## 2024-06-14 NOTE — REASON FOR VISIT
[Follow-Up] : a follow-up visit [FreeTextEntry1] :  follow up [facility] CERTIFIED  rhinitis and GERD, ILD

## 2024-06-14 NOTE — ASSESSMENT
[FreeTextEntry1] : hoarse, weak voice  refer to ENT  also recommended to see Neurologist to evaluate for masculo- neurological disease    GERD- worsening GERD, increase Pepcid to 20 mg BID   may use 2 tabs if 1 not effective   refer to GI

## 2024-06-14 NOTE — PAST MEDICAL HISTORY
[FreeTextEntry1] : Exp hx: worked at [employer] 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.  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

## 2024-06-14 NOTE — HISTORY OF PRESENT ILLNESS
[FreeTextEntry1] :   pt with Dx of mild ILD   followed by pulm    c/o weak voice  horse voice   unable to speak louder   PND   throat clearing    lost 15 lbs after he was started on Metformin and diabetic diet 1 month ago for DM   also has worsening of heartburn    pt stated that he has elevated PSA and abnormal prostate MRI findings  he had  prostate Bx last year but it was negative   he stated that he has PSA on pancho rise now   followed by Dr Ferrera, his private urologist   he stated that he has been having worsening dry cough   dry and wet   he was evaluated by PULM   had PFT and referred for CXR   started on Singular   cough somewhat better after starting Singular  cylindrical bronchiectasis on CT chest 2016 he stated that was evaluated by Pulmonologist in the past for this at 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 and NON IUP " allergies" - nasal congestion , runny nose, itchy eyes   nasal sprays and eye drops are helpful with his Sx   tries to use saline spray most of the time, currently using Flonase- Azelastine nasal spray  daily and ipratropium for rhinorrhea  Allegra as needed  He was evaluated by ENT in the past and recommended IT and dev septum surgery,   c/o itchy watery eyes- using Patanol eye drops with improvement     he stated that he does snore and was diagnosed with EVERTON shortly after 09 11 2001 but WAS unable to tolerate the CPAP mask  he stated that he had " walking pneumonia " and was treated with Abx 02 2018   has no cough, wheezing or SOB at this time reported that was vaccinated for Pneumonia in the past   Soc hx: 2 kids , wife works as an exporter for pharmaceuticals    Colonoscopy 10/ 2019 nl COLONOSCOPY  had cardiac w/up BY  Dr LIsker for Cardiac Calcification LMCA, managed medically.       And he has severe arthritis in his left knee with a torn meniscus that WAS  repaired 5/2019.  He denies any exertional chest pains or shortness of breath, though he does not exert himself much of his knee pain. hx  hyperlipidemia and Hx rheumatic fever

## 2024-07-02 ENCOUNTER — APPOINTMENT (OUTPATIENT)
Dept: OTOLARYNGOLOGY | Facility: CLINIC | Age: 79
End: 2024-07-02

## 2024-07-02 ENCOUNTER — NON-APPOINTMENT (OUTPATIENT)
Age: 79
End: 2024-07-02

## 2024-07-19 ENCOUNTER — APPOINTMENT (OUTPATIENT)
Dept: GASTROENTEROLOGY | Facility: CLINIC | Age: 79
End: 2024-07-19
Payer: COMMERCIAL

## 2024-07-19 VITALS
BODY MASS INDEX: 23.04 KG/M2 | SYSTOLIC BLOOD PRESSURE: 123 MMHG | OXYGEN SATURATION: 96 % | WEIGHT: 130 LBS | DIASTOLIC BLOOD PRESSURE: 80 MMHG | HEIGHT: 63 IN | HEART RATE: 85 BPM

## 2024-07-19 DIAGNOSIS — R13.10 DYSPHAGIA, UNSPECIFIED: ICD-10-CM

## 2024-07-19 DIAGNOSIS — R12 HEARTBURN: ICD-10-CM

## 2024-07-19 DIAGNOSIS — R19.4 CHANGE IN BOWEL HABIT: ICD-10-CM

## 2024-07-19 PROCEDURE — 99203 OFFICE O/P NEW LOW 30 MIN: CPT

## 2024-07-19 RX ORDER — SODIUM SULFATE, POTASSIUM SULFATE AND MAGNESIUM SULFATE 1.6; 3.13; 17.5 G/177ML; G/177ML; G/177ML
17.5-3.13-1.6 SOLUTION ORAL
Qty: 1 | Refills: 0 | Status: ACTIVE | COMMUNITY
Start: 2024-07-19 | End: 1900-01-01

## 2024-07-31 ENCOUNTER — RESULT REVIEW (OUTPATIENT)
Age: 79
End: 2024-07-31

## 2024-07-31 ENCOUNTER — OUTPATIENT (OUTPATIENT)
Dept: OUTPATIENT SERVICES | Facility: HOSPITAL | Age: 79
LOS: 1 days | End: 2024-07-31
Payer: COMMERCIAL

## 2024-07-31 ENCOUNTER — TRANSCRIPTION ENCOUNTER (OUTPATIENT)
Age: 79
End: 2024-07-31

## 2024-07-31 ENCOUNTER — APPOINTMENT (OUTPATIENT)
Dept: GASTROENTEROLOGY | Facility: HOSPITAL | Age: 79
End: 2024-07-31

## 2024-07-31 VITALS
HEART RATE: 54 BPM | OXYGEN SATURATION: 100 % | SYSTOLIC BLOOD PRESSURE: 141 MMHG | RESPIRATION RATE: 18 BRPM | DIASTOLIC BLOOD PRESSURE: 62 MMHG

## 2024-07-31 VITALS
SYSTOLIC BLOOD PRESSURE: 147 MMHG | HEART RATE: 72 BPM | RESPIRATION RATE: 18 BRPM | OXYGEN SATURATION: 100 % | DIASTOLIC BLOOD PRESSURE: 72 MMHG | HEIGHT: 65 IN | TEMPERATURE: 97 F | WEIGHT: 125 LBS

## 2024-07-31 DIAGNOSIS — R12 HEARTBURN: ICD-10-CM

## 2024-07-31 DIAGNOSIS — R13.10 DYSPHAGIA, UNSPECIFIED: ICD-10-CM

## 2024-07-31 DIAGNOSIS — Z98.890 OTHER SPECIFIED POSTPROCEDURAL STATES: Chronic | ICD-10-CM

## 2024-07-31 DIAGNOSIS — K21.9 GASTRO-ESOPHAGEAL REFLUX DISEASE WITHOUT ESOPHAGITIS: ICD-10-CM

## 2024-07-31 DIAGNOSIS — R19.4 CHANGE IN BOWEL HABIT: ICD-10-CM

## 2024-07-31 LAB — GLUCOSE BLDC GLUCOMTR-MCNC: 275 MG/DL — HIGH (ref 70–99)

## 2024-07-31 PROCEDURE — 88305 TISSUE EXAM BY PATHOLOGIST: CPT

## 2024-07-31 PROCEDURE — 45380 COLONOSCOPY AND BIOPSY: CPT | Mod: PT

## 2024-07-31 PROCEDURE — 43239 EGD BIOPSY SINGLE/MULTIPLE: CPT

## 2024-07-31 PROCEDURE — 82962 GLUCOSE BLOOD TEST: CPT

## 2024-07-31 PROCEDURE — 88305 TISSUE EXAM BY PATHOLOGIST: CPT | Mod: 26

## 2024-07-31 PROCEDURE — 45380 COLONOSCOPY AND BIOPSY: CPT

## 2024-07-31 DEVICE — NET RETRV ROT ROTH 2.5MMX230CM: Type: IMPLANTABLE DEVICE | Status: FUNCTIONAL

## 2024-07-31 RX ORDER — TAMSULOSIN HCL 0.4 MG
1 CAPSULE ORAL
Refills: 0 | DISCHARGE

## 2024-07-31 RX ORDER — LIPOSOMAL UBIQUINOL 100 MG/ML
1 LIQUID (ML) ORAL
Refills: 0 | DISCHARGE

## 2024-07-31 RX ORDER — FLUTICASONE PROPIONATE 100 %
0 POWDER (GRAM) MISCELLANEOUS
Refills: 0 | DISCHARGE

## 2024-07-31 RX ORDER — LYSINE HCL 500 MG
0 TABLET ORAL
Refills: 0 | DISCHARGE

## 2024-07-31 RX ORDER — IPRATROPIUM BROMIDE 21 UG/1
2 SPRAY, METERED NASAL
Refills: 0 | DISCHARGE

## 2024-07-31 RX ORDER — FEXOFENADINE HCL 30 MG/5 ML
1 SUSPENSION, ORAL (FINAL DOSE FORM) ORAL
Refills: 0 | DISCHARGE

## 2024-07-31 RX ORDER — ASHW RT/MAG BRK/EPMD/THEAN/PHO 200-150 MG
0 TABLET ORAL
Refills: 0 | DISCHARGE

## 2024-07-31 RX ORDER — ATORVASTATIN CALCIUM 40 MG/1
1 TABLET, FILM COATED ORAL
Refills: 0 | DISCHARGE

## 2024-07-31 RX ORDER — CALCIUM CARBONATE 500(1250)
2 TABLET ORAL
Refills: 0 | DISCHARGE

## 2024-07-31 RX ORDER — EZETIMIBE 10 MG/1
1 TABLET ORAL
Refills: 0 | DISCHARGE

## 2024-07-31 RX ORDER — BACTERIOSTATIC SODIUM CHLORIDE 0.9 %
500 VIAL (ML) INJECTION
Refills: 0 | Status: COMPLETED | OUTPATIENT
Start: 2024-07-31 | End: 2024-07-31

## 2024-07-31 RX ADMIN — Medication 30 MILLILITER(S): at 09:07

## 2024-07-31 NOTE — ASU DISCHARGE PLAN (ADULT/PEDIATRIC) - NS MD DC FALL RISK RISK
For information on Fall & Injury Prevention, visit: [email or web address] OR  [email or web address] OR  https://www.cdc.gov/steadi/patient.html

## 2024-07-31 NOTE — PRE PROCEDURE NOTE - PRE PROCEDURE EVALUATION
Attending Physician:        Ramos Fisher MD                    Procedure:    Indication for Procedure: dysphagia, weight loss  ________________________________________________________  PAST MEDICAL & SURGICAL HISTORY:  HLD (hyperlipidemia)      Mild HTN      Type II diabetes mellitus      Osteoporosis      Osteoarthritis      History of BPH        ALLERGIES:  latex (Rash; Blisters; Hives)  tetracycline (Hives; Rash)    HOME MEDICATIONS:    AICD/PPM: [ ] yes   [x ] no    PERTINENT LAB DATA:                      PHYSICAL EXAMINATION:    T(C): --  HR: --  BP: --  RR: --  SpO2: --    Constitutional: NAD  HEENT: PERRLA, EOMI,    Neck:  No JVD  Respiratory: CTAB/L  Cardiovascular: S1 and S2  Gastrointestinal: BS+, soft, NT/ND  Extremities: No peripheral edema  Neurological: A/O x 3, no focal deficits  Psychiatric: Normal mood, normal affect  Skin: No rashes    ASA Class: I [ ]  II [x ]  III [ ]  IV [ ]    COMMENTS:    The patient is a suitable candidate for the planned procedure unless box checked [ ]  No, explain:

## 2024-07-31 NOTE — ASU PATIENT PROFILE, ADULT - NSICDXPASTMEDICALHX_GEN_ALL_CORE_FT
PAST MEDICAL HISTORY:  History of BPH     HLD (hyperlipidemia)     Listeria infection     Mild HTN     Osteoarthritis     Osteoporosis     Type II diabetes mellitus

## 2024-07-31 NOTE — ASU PATIENT PROFILE, ADULT - NSICDXPASTSURGICALHX_GEN_ALL_CORE_FT
0700: End of Shift Note    Bedside shift change report given to , RN (oncoming nurse) by Alex Sewell (offgoing nurse). Report included the following information SBAR, Kardex, Intake/Output, MAR, Recent Results and Cardiac Rhythm NSR    Shift worked:  Night     Shift summary and any significant changes:     Patient has been NPO since midnight. CHG bath done and patient shaved incase of procedure after cardiology consult. Patient had complaints of 7/10 chest pain around 0441 this AM, EKG was done and one sublingual nitro given for relief. Concerns for physician to address:       Zone phone for oncoming shift:          Activity:  Activity Level: Up with Assistance  Number times ambulated in hallways past shift: 0  Number of times OOB to chair past shift: 1    Cardiac:   Cardiac Monitoring: Yes      Cardiac Rhythm: Sinus Rhythm    Access:   Current line(s): PIV     Genitourinary:   Urinary status: voiding    Respiratory:   O2 Device: None (Room air)  Chronic home O2 use?: NO  Incentive spirometer at bedside: NO       GI:     Current diet:  DIET NPO  Passing flatus: YES  Tolerating current diet: YES       Pain Management:   Patient states pain is manageable on current regimen: YES    Skin:  Dixon Score: 23  Interventions: increase time out of bed and PT/OT consult    Patient Safety:  Fall Score:  Total Score: 2  Interventions: bed/chair alarm, gripper socks and pt to call before getting OOB       Length of Stay:  Expected LOS: - - -  Actual LOS: 1      Elizabeth Long PAST SURGICAL HISTORY:  S/P medial meniscal repair L    S/P rotator cuff repair R     PAST SURGICAL HISTORY:  H/O prostate biopsy     S/P medial meniscal repair L    S/P rotator cuff repair R

## 2024-07-31 NOTE — ASU DISCHARGE PLAN (ADULT/PEDIATRIC) - CARE PROVIDER_API CALL
Ramos Fisher  Gastroenterology  [address] [place] [address] [address]  [place] [address]  Phone: [phone]  Fax: [phone]  Follow Up Time:

## 2024-08-06 LAB — SURGICAL PATHOLOGY STUDY: SIGNIFICANT CHANGE UP

## 2024-08-15 ENCOUNTER — NON-APPOINTMENT (OUTPATIENT)
Age: 79
End: 2024-08-15

## 2024-09-06 ENCOUNTER — APPOINTMENT (OUTPATIENT)
Dept: OTHER | Facility: CLINIC | Age: 79
End: 2024-09-06
Payer: COMMERCIAL

## 2024-09-06 ENCOUNTER — RESULT REVIEW (OUTPATIENT)
Age: 79
End: 2024-09-06

## 2024-09-06 VITALS
DIASTOLIC BLOOD PRESSURE: 73 MMHG | SYSTOLIC BLOOD PRESSURE: 113 MMHG | TEMPERATURE: 98.2 F | HEART RATE: 82 BPM | BODY MASS INDEX: 21.97 KG/M2 | OXYGEN SATURATION: 96 % | HEIGHT: 63 IN | WEIGHT: 124 LBS

## 2024-09-06 DIAGNOSIS — R63.4 ABNORMAL WEIGHT LOSS: ICD-10-CM

## 2024-09-06 DIAGNOSIS — J84.9 INTERSTITIAL PULMONARY DISEASE, UNSPECIFIED: ICD-10-CM

## 2024-09-06 DIAGNOSIS — R49.8 OTHER VOICE AND RESONANCE DISORDERS: ICD-10-CM

## 2024-09-06 DIAGNOSIS — Z12.9 ENCOUNTER FOR SCREENING FOR MALIGNANT NEOPLASM, SITE UNSPECIFIED: ICD-10-CM

## 2024-09-06 PROBLEM — E11.9 TYPE 2 DIABETES MELLITUS WITHOUT COMPLICATIONS: Chronic | Status: ACTIVE | Noted: 2024-07-31

## 2024-09-06 PROBLEM — I10 ESSENTIAL (PRIMARY) HYPERTENSION: Chronic | Status: ACTIVE | Noted: 2024-07-31

## 2024-09-06 PROBLEM — M19.90 UNSPECIFIED OSTEOARTHRITIS, UNSPECIFIED SITE: Chronic | Status: ACTIVE | Noted: 2024-07-31

## 2024-09-06 PROBLEM — Z87.438 PERSONAL HISTORY OF OTHER DISEASES OF MALE GENITAL ORGANS: Chronic | Status: ACTIVE | Noted: 2024-07-31

## 2024-09-06 PROBLEM — M81.0 AGE-RELATED OSTEOPOROSIS WITHOUT CURRENT PATHOLOGICAL FRACTURE: Chronic | Status: ACTIVE | Noted: 2024-07-31

## 2024-09-06 PROBLEM — A32.9: Chronic | Status: ACTIVE | Noted: 2024-07-31

## 2024-09-06 PROBLEM — E78.5 HYPERLIPIDEMIA, UNSPECIFIED: Chronic | Status: ACTIVE | Noted: 2024-07-31

## 2024-09-06 PROCEDURE — 99214 OFFICE O/P EST MOD 30 MIN: CPT

## 2024-09-06 RX ORDER — METFORMIN HYDROCHLORIDE 1000 MG/1
1000 TABLET, COATED ORAL DAILY
Refills: 0 | Status: ACTIVE | COMMUNITY
Start: 2024-09-06

## 2024-09-06 NOTE — PAST MEDICAL HISTORY
[FreeTextEntry1] : Exp hx: worked at [employer] 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.  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

## 2024-09-06 NOTE — HISTORY OF PRESENT ILLNESS
[FreeTextEntry1] :   pt with Dx of mild ILD   followed by pulm    c/o weak voice " i keep losing my voice"   unable to speak louder   PND   throat clearing    lost 30  lbs after he was started on Metformin and diabetic diet 4  month ago  had EGD / small HH/  and colonoscopy  7/31/2024  Surgical Pathology Report - Auth (Verified)  Specimen(s) Submitted 1  Duodenum, biopsy 2  Cecal polyp  Final Diagnosis 1.  Duodenum, biopsy: -   Mild peptic duodenitis -   Negative for gluten sensitive enteropathy   2.  Cecum, polyp, biopsy: -    Tubular adenoma   pt stated that he has elevated PSA and abnormal prostate MRI findings  he had  prostate Bx last year but it was negative   he stated that he has PSA on pancho rise now   followed by Dr Ferrera, his private urologist   he stated that he has been having worsening dry cough   dry and wet   he was evaluated by PULM   had PFT and referred for CXR   started on Singular   cough somewhat better after starting Singular  cylindrical bronchiectasis on CT chest 2016 he stated that was evaluated by Pulmonologist in the past for this at [facility]   CT chest report 10 25 2023  finding c/w progressive fibrotic interstitial lung disease c/w UIP and NON IUP " allergies" - nasal congestion , runny nose, itchy eyes   nasal sprays and eye drops are helpful with his Sx   tries to use saline spray most of the time, currently using Flonase- Azelastine nasal spray  daily and ipratropium for rhinorrhea  Allegra as needed  He was evaluated by ENT in the past and recommended IT and dev septum surgery,   c/o itchy watery eyes- using Patanol eye drops with improvement     he stated that he does snore and was diagnosed with EVERTON shortly after 09 11 2001 but WAS unable to tolerate the CPAP mask  he stated that he had " walking pneumonia " and was treated with Abx 02 2018   has no cough, wheezing or SOB at this time reported that was vaccinated for Pneumonia in the past   Soc hx: 2 kids , wife works as an exporter for pharmaceuticals    had cardiac w/up BY  Dr LIsker for Cardiac Calcification LMCA, managed medically.       And he has severe arthritis in his left knee with a torn meniscus that WAS  repaired 5/2019.  He denies any exertional chest pains or shortness of breath, though he does not exert himself much of his knee pain. hx  hyperlipidemia and Hx rheumatic fever

## 2024-09-06 NOTE — ASSESSMENT
[FreeTextEntry1] : hoarse, weak voice  refer to ENT  also recommended to see Neurologist to evaluate for masculo- neurological disease     weight loss 30 lbs in 5 months , PCP attributed to metformin   reported that has High BS   new onset DM  reported that BS in 300   GI w/up is negative    ILD - PULM follow up  eval pending 09 17 2024   last imaging 12 2023   as per pulmonologist note - slowly progressing ILD

## 2024-09-06 NOTE — REASON FOR VISIT
[Follow-Up] : a follow-up visit [FreeTextEntry1] :  follow up [facility] CERTIFIED  rhinitis and GERD, ILD

## 2024-09-08 LAB
ALBUMIN SERPL ELPH-MCNC: 4.2 G/DL
ALP BLD-CCNC: 94 U/L
ALT SERPL-CCNC: 15 U/L
ANION GAP SERPL CALC-SCNC: 16 MMOL/L
APPEARANCE: CLEAR
AST SERPL-CCNC: 22 U/L
BACTERIA: NEGATIVE /HPF
BILIRUB SERPL-MCNC: 0.5 MG/DL
BILIRUBIN URINE: NEGATIVE
BLOOD URINE: NEGATIVE
BUN SERPL-MCNC: 15 MG/DL
CALCIUM SERPL-MCNC: 9.7 MG/DL
CAST: 0 /LPF
CHLORIDE SERPL-SCNC: 94 MMOL/L
CO2 SERPL-SCNC: 22 MMOL/L
COLOR: YELLOW
CREAT SERPL-MCNC: 0.52 MG/DL
EGFR: 104 ML/MIN/1.73M2
EPITHELIAL CELLS: 0 /HPF
GLUCOSE QUALITATIVE U: >=1000 MG/DL
GLUCOSE SERPL-MCNC: 398 MG/DL
HCT VFR BLD CALC: 41.7 %
HGB BLD-MCNC: 14.1 G/DL
KETONES URINE: 15 MG/DL
LEUKOCYTE ESTERASE URINE: NEGATIVE
MCHC RBC-ENTMCNC: 31.1 PG
MCHC RBC-ENTMCNC: 33.8 GM/DL
MCV RBC AUTO: 91.9 FL
MICROSCOPIC-UA: NORMAL
NITRITE URINE: NEGATIVE
PH URINE: 6.5
PLATELET # BLD AUTO: 231 K/UL
POTASSIUM SERPL-SCNC: 4.7 MMOL/L
PROT SERPL-MCNC: 6.5 G/DL
PROTEIN URINE: NEGATIVE MG/DL
PSA SERPL-MCNC: 1.06 NG/ML
RBC # BLD: 4.54 M/UL
RBC # FLD: 13.3 %
RED BLOOD CELLS URINE: 0 /HPF
SODIUM SERPL-SCNC: 132 MMOL/L
SPECIFIC GRAVITY URINE: >1.03
UROBILINOGEN URINE: 0.2 MG/DL
WBC # FLD AUTO: 6.04 K/UL
WHITE BLOOD CELLS URINE: 0 /HPF

## 2024-09-09 ENCOUNTER — OUTPATIENT (OUTPATIENT)
Dept: OUTPATIENT SERVICES | Facility: HOSPITAL | Age: 79
LOS: 1 days | End: 2024-09-09
Payer: COMMERCIAL

## 2024-09-09 ENCOUNTER — APPOINTMENT (OUTPATIENT)
Dept: MRI IMAGING | Facility: CLINIC | Age: 79
End: 2024-09-09
Payer: COMMERCIAL

## 2024-09-09 DIAGNOSIS — N40.0 BENIGN PROSTATIC HYPERPLASIA WITHOUT LOWER URINARY TRACT SYMPTOMS: ICD-10-CM

## 2024-09-09 DIAGNOSIS — E11.9 TYPE 2 DIABETES MELLITUS WITHOUT COMPLICATIONS: ICD-10-CM

## 2024-09-09 DIAGNOSIS — Z00.8 ENCOUNTER FOR OTHER GENERAL EXAMINATION: ICD-10-CM

## 2024-09-09 DIAGNOSIS — Z98.890 OTHER SPECIFIED POSTPROCEDURAL STATES: Chronic | ICD-10-CM

## 2024-09-09 DIAGNOSIS — K86.2 CYST OF PANCREAS: ICD-10-CM

## 2024-09-09 DIAGNOSIS — K86.89 OTHER SPECIFIED DISEASES OF PANCREAS: ICD-10-CM

## 2024-09-09 DIAGNOSIS — K57.30 DIVERTICULOSIS OF LARGE INTESTINE WITHOUT PERFORATION OR ABSCESS WITHOUT BLEEDING: ICD-10-CM

## 2024-09-09 DIAGNOSIS — R63.4 ABNORMAL WEIGHT LOSS: ICD-10-CM

## 2024-09-09 DIAGNOSIS — N28.1 CYST OF KIDNEY, ACQUIRED: ICD-10-CM

## 2024-09-09 PROCEDURE — 72197 MRI PELVIS W/O & W/DYE: CPT | Mod: 26

## 2024-09-09 PROCEDURE — 72197 MRI PELVIS W/O & W/DYE: CPT

## 2024-09-09 PROCEDURE — 74183 MRI ABD W/O CNTR FLWD CNTR: CPT

## 2024-09-09 PROCEDURE — A9585: CPT

## 2024-09-09 PROCEDURE — 74183 MRI ABD W/O CNTR FLWD CNTR: CPT | Mod: 26

## 2024-09-17 ENCOUNTER — APPOINTMENT (OUTPATIENT)
Dept: PULMONOLOGY | Facility: CLINIC | Age: 79
End: 2024-09-17
Payer: MEDICARE

## 2024-09-17 ENCOUNTER — NON-APPOINTMENT (OUTPATIENT)
Age: 79
End: 2024-09-17

## 2024-09-17 VITALS
TEMPERATURE: 98.2 F | SYSTOLIC BLOOD PRESSURE: 119 MMHG | WEIGHT: 118 LBS | HEIGHT: 63 IN | DIASTOLIC BLOOD PRESSURE: 75 MMHG | BODY MASS INDEX: 20.91 KG/M2 | RESPIRATION RATE: 16 BRPM | HEART RATE: 73 BPM | OXYGEN SATURATION: 98 %

## 2024-09-17 DIAGNOSIS — G47.33 OBSTRUCTIVE SLEEP APNEA (ADULT) (PEDIATRIC): ICD-10-CM

## 2024-09-17 DIAGNOSIS — J84.9 INTERSTITIAL PULMONARY DISEASE, UNSPECIFIED: ICD-10-CM

## 2024-09-17 PROCEDURE — G2211 COMPLEX E/M VISIT ADD ON: CPT

## 2024-09-17 PROCEDURE — 99215 OFFICE O/P EST HI 40 MIN: CPT

## 2024-09-17 NOTE — ASSESSMENT
[FreeTextEntry1] : 78y/o M with WTC exposure, medical issue of DM, ILD, who present for F/U.   Is doing well overall, active, walking up to 1 mile daily to and from train station for exercise. Currently denies cough, SOB, wheezing, fever, chills, chest tightness, change in voice, or rhinorrhea. Continue to use Montelukast daily for seasonal allergies. With recent diagnosis of DM on Metformin has lost 40lds since. Reports some muscle weakness since weight loss. Endoscopy, colonoscopy, and MRI with negative findings. Continue to endorse hoarseness, with scheduled visit to ENT today.   -Continue Singular -ENT F/U schedule for today -CT Chest in December, order placed -F/U with PFT scheduled for January - Vaccinated: Flu, PNA, RSV, Covid  attending: agree with above from a pulmonary standpoint, doing well. no symptoms.  recently dx of DM, started on metformin. 40 lb weight loss, being evaluated, neg thus far

## 2024-09-17 NOTE — HISTORY OF PRESENT ILLNESS
[Never] : never [TextBox_4] : Patient is a 80y/o M with WTC exposure, medical issue of DM, ILD, who present for F/U.    Is doing well overall, active, walking up to 1 mile daily to and from train station for exercise. Currently denies cough, SOB, wheezing, fever, chills, chest tightness, change in voice, or rhinorrhea. Continue to use Montelukast daily for seasonal allergies. With recent diagnosis of DM on Metformin has lost 40lds since. Endoscopy, colonoscopy, and MRI with negative findings. Continue to endorse hoarseness, with scheduled visit to ENT today.

## 2024-09-17 NOTE — REVIEW OF SYSTEMS
[Negative] : Endocrine [Recent Wt Gain (___ Lbs)] : ~T recent [unfilled] lb weight gain [Recent Wt Loss (___ Lbs)] : ~T recent [unfilled] lb weight loss [Fever] : no fever [Fatigue] : no fatigue [EDS] : no eds [Chills] : no chills [Poor Appetite] : no poor appetite

## 2024-09-17 NOTE — ASSESSMENT
[FreeTextEntry1] : 80y/o M with WTC exposure, medical issue of DM, ILD, who present for F/U.   Is doing well overall, active, walking up to 1 mile daily to and from train station for exercise. Currently denies cough, SOB, wheezing, fever, chills, chest tightness, change in voice, or rhinorrhea. Continue to use Montelukast daily for seasonal allergies. With recent diagnosis of DM on Metformin has lost 40lds since. Reports some muscle weakness since weight loss. Endoscopy, colonoscopy, and MRI with negative findings. Continue to endorse hoarseness, with scheduled visit to ENT today.   -Continue Singular -ENT F/U schedule for today -CT Chest in December, order placed -F/U with PFT scheduled for January - Vaccinated: Flu, PNA, RSV, Covid  attending: agree with above from a pulmonary standpoint, doing well. no symptoms.  recently dx of DM, started on metformin. 40 lb weight loss, being evaluated, neg thus far

## 2024-10-07 ENCOUNTER — APPOINTMENT (OUTPATIENT)
Dept: OTOLARYNGOLOGY | Facility: CLINIC | Age: 79
End: 2024-10-07
Payer: COMMERCIAL

## 2024-10-07 DIAGNOSIS — R13.10 DYSPHAGIA, UNSPECIFIED: ICD-10-CM

## 2024-10-07 DIAGNOSIS — R49.0 DYSPHONIA: ICD-10-CM

## 2024-10-07 PROCEDURE — 99203 OFFICE O/P NEW LOW 30 MIN: CPT | Mod: 25

## 2024-10-07 PROCEDURE — 31579 LARYNGOSCOPY TELESCOPIC: CPT

## 2024-10-23 ENCOUNTER — APPOINTMENT (OUTPATIENT)
Dept: RADIOLOGY | Facility: HOSPITAL | Age: 79
End: 2024-10-23
Payer: COMMERCIAL

## 2024-10-23 ENCOUNTER — OUTPATIENT (OUTPATIENT)
Dept: OUTPATIENT SERVICES | Facility: HOSPITAL | Age: 79
LOS: 1 days | Discharge: HOME | End: 2024-10-23

## 2024-10-23 ENCOUNTER — APPOINTMENT (OUTPATIENT)
Dept: SPEECH THERAPY | Facility: HOSPITAL | Age: 79
End: 2024-10-23
Payer: COMMERCIAL

## 2024-10-23 ENCOUNTER — OUTPATIENT (OUTPATIENT)
Dept: OUTPATIENT SERVICES | Facility: HOSPITAL | Age: 79
LOS: 1 days | End: 2024-10-23

## 2024-10-23 DIAGNOSIS — Z98.890 OTHER SPECIFIED POSTPROCEDURAL STATES: Chronic | ICD-10-CM

## 2024-10-23 DIAGNOSIS — R13.10 DYSPHAGIA, UNSPECIFIED: ICD-10-CM

## 2024-10-23 PROCEDURE — 74230 X-RAY XM SWLNG FUNCJ C+: CPT | Mod: 26

## 2024-10-24 DIAGNOSIS — R13.10 DYSPHAGIA, UNSPECIFIED: ICD-10-CM

## 2024-11-06 ENCOUNTER — APPOINTMENT (OUTPATIENT)
Dept: OTOLARYNGOLOGY | Facility: CLINIC | Age: 79
End: 2024-11-06

## 2024-11-11 DIAGNOSIS — R49.0 DYSPHONIA: ICD-10-CM

## 2024-11-19 ENCOUNTER — APPOINTMENT (OUTPATIENT)
Dept: OTHER | Facility: CLINIC | Age: 79
End: 2024-11-19

## 2024-11-19 VITALS
HEIGHT: 63 IN | BODY MASS INDEX: 22.68 KG/M2 | HEART RATE: 74 BPM | DIASTOLIC BLOOD PRESSURE: 66 MMHG | TEMPERATURE: 99.8 F | SYSTOLIC BLOOD PRESSURE: 110 MMHG | WEIGHT: 128 LBS | OXYGEN SATURATION: 94 %

## 2024-11-19 DIAGNOSIS — K21.9 GASTRO-ESOPHAGEAL REFLUX DISEASE W/OUT ESOPHAGITIS: ICD-10-CM

## 2024-11-19 DIAGNOSIS — J31.0 CHRONIC RHINITIS: ICD-10-CM

## 2024-11-19 DIAGNOSIS — J84.9 INTERSTITIAL PULMONARY DISEASE, UNSPECIFIED: ICD-10-CM

## 2024-11-19 DIAGNOSIS — Z04.9 ENCOUNTER FOR EXAMINATION AND OBSERVATION FOR UNSPECIFIED REASON: ICD-10-CM

## 2024-11-19 PROCEDURE — 99214 OFFICE O/P EST MOD 30 MIN: CPT | Mod: 25

## 2024-11-19 PROCEDURE — 99397 PER PM REEVAL EST PAT 65+ YR: CPT | Mod: 25

## 2024-11-20 LAB
ALBUMIN SERPL ELPH-MCNC: 4.1 G/DL
ALP BLD-CCNC: 93 U/L
ALT SERPL-CCNC: 14 U/L
ANION GAP SERPL CALC-SCNC: 12 MMOL/L
APPEARANCE: CLEAR
AST SERPL-CCNC: 16 U/L
BACTERIA: NEGATIVE /HPF
BILIRUB SERPL-MCNC: 0.4 MG/DL
BILIRUBIN URINE: NEGATIVE
BLOOD URINE: NEGATIVE
BUN SERPL-MCNC: 21 MG/DL
CALCIUM SERPL-MCNC: 9.4 MG/DL
CAST: 0 /LPF
CHLORIDE SERPL-SCNC: 101 MMOL/L
CHOLEST SERPL-MCNC: 162 MG/DL
CO2 SERPL-SCNC: 24 MMOL/L
COLOR: YELLOW
CREAT SERPL-MCNC: 0.67 MG/DL
EGFR: 96 ML/MIN/1.73M2
EPITHELIAL CELLS: 0 /HPF
GLUCOSE QUALITATIVE U: 500 MG/DL
GLUCOSE SERPL-MCNC: 169 MG/DL
HCT VFR BLD CALC: 41.8 %
HDLC SERPL-MCNC: 82 MG/DL
HGB BLD-MCNC: 14 G/DL
KETONES URINE: NEGATIVE MG/DL
LDLC SERPL CALC-MCNC: 69 MG/DL
LEUKOCYTE ESTERASE URINE: NEGATIVE
MCHC RBC-ENTMCNC: 30.6 PG
MCHC RBC-ENTMCNC: 33.5 G/DL
MCV RBC AUTO: 91.5 FL
MICROSCOPIC-UA: NORMAL
NITRITE URINE: NEGATIVE
NONHDLC SERPL-MCNC: 80 MG/DL
PH URINE: 5.5
PLATELET # BLD AUTO: 279 K/UL
POTASSIUM SERPL-SCNC: 4.2 MMOL/L
PROT SERPL-MCNC: 6.8 G/DL
PROTEIN URINE: NORMAL MG/DL
RBC # BLD: 4.57 M/UL
RBC # FLD: 12.8 %
RED BLOOD CELLS URINE: 1 /HPF
SODIUM SERPL-SCNC: 137 MMOL/L
SPECIFIC GRAVITY URINE: 1.03
TRIGL SERPL-MCNC: 57 MG/DL
UROBILINOGEN URINE: 0.2 MG/DL
WBC # FLD AUTO: 6.58 K/UL
WHITE BLOOD CELLS URINE: 0 /HPF

## 2024-12-18 ENCOUNTER — APPOINTMENT (OUTPATIENT)
Dept: CT IMAGING | Facility: IMAGING CENTER | Age: 79
End: 2024-12-18
Payer: COMMERCIAL

## 2024-12-18 ENCOUNTER — OUTPATIENT (OUTPATIENT)
Dept: OUTPATIENT SERVICES | Facility: HOSPITAL | Age: 79
LOS: 1 days | End: 2024-12-18
Payer: COMMERCIAL

## 2024-12-18 ENCOUNTER — RESULT REVIEW (OUTPATIENT)
Age: 79
End: 2024-12-18

## 2024-12-18 DIAGNOSIS — Z98.890 OTHER SPECIFIED POSTPROCEDURAL STATES: Chronic | ICD-10-CM

## 2024-12-18 DIAGNOSIS — J84.9 INTERSTITIAL PULMONARY DISEASE, UNSPECIFIED: ICD-10-CM

## 2024-12-18 PROCEDURE — 71250 CT THORAX DX C-: CPT

## 2024-12-18 PROCEDURE — 71250 CT THORAX DX C-: CPT | Mod: 26

## 2025-01-10 ENCOUNTER — NON-APPOINTMENT (OUTPATIENT)
Age: 80
End: 2025-01-10

## 2025-01-15 ENCOUNTER — APPOINTMENT (OUTPATIENT)
Dept: PULMONOLOGY | Facility: CLINIC | Age: 80
End: 2025-01-15
Payer: COMMERCIAL

## 2025-01-15 VITALS
BODY MASS INDEX: 23.05 KG/M2 | SYSTOLIC BLOOD PRESSURE: 116 MMHG | OXYGEN SATURATION: 98 % | HEART RATE: 85 BPM | WEIGHT: 135 LBS | HEIGHT: 64 IN | DIASTOLIC BLOOD PRESSURE: 78 MMHG

## 2025-01-15 DIAGNOSIS — R49.0 DYSPHONIA: ICD-10-CM

## 2025-01-15 DIAGNOSIS — R93.89 ABNORMAL FINDINGS ON DIAGNOSTIC IMAGING OF OTHER SPECIFIED BODY STRUCTURES: ICD-10-CM

## 2025-01-15 PROCEDURE — ZZZZZ: CPT

## 2025-01-15 PROCEDURE — 94010 BREATHING CAPACITY TEST: CPT

## 2025-01-15 PROCEDURE — 99213 OFFICE O/P EST LOW 20 MIN: CPT | Mod: 25

## 2025-01-15 PROCEDURE — 94729 DIFFUSING CAPACITY: CPT

## 2025-01-15 PROCEDURE — 94726 PLETHYSMOGRAPHY LUNG VOLUMES: CPT

## (undated) DEVICE — TUBING SUCTION CONN 6FT STERILE

## (undated) DEVICE — IRRIGATOR BIO SHIELD

## (undated) DEVICE — TUBING IV SET GRAVITY 3Y 100" MACRO

## (undated) DEVICE — FORCEP RADIAL JAW 4 JUMBO 2.8MM 3.2MM 240CM ORANGE DISP

## (undated) DEVICE — BRUSH COLONOSCOPY CYTOLOGY

## (undated) DEVICE — PACK IV START WITH CHG

## (undated) DEVICE — BIOPSY FORCEP RADIAL JAW 4 STANDARD WITH NEEDLE

## (undated) DEVICE — ELCTR GROUNDING PAD ADULT COVIDIEN

## (undated) DEVICE — SYR ALLIANCE II INFLATION 60ML

## (undated) DEVICE — CATH IV SAFE BC 22G X 1" (BLUE)

## (undated) DEVICE — POLY TRAP ETRAP

## (undated) DEVICE — FOLEY HOLDER STATLOCK 2 WAY ADULT

## (undated) DEVICE — SYR LUER LOK 50CC

## (undated) DEVICE — SUCTION YANKAUER NO CONTROL VENT

## (undated) DEVICE — SOL INJ NS 0.9% 500ML 2 PORT

## (undated) DEVICE — CATH IV SAFE BC 20G X 1.16" (PINK)

## (undated) DEVICE — CLAMP BX HOT RAD JAW 3

## (undated) DEVICE — BALLOON US ENDO

## (undated) DEVICE — ENDOCUFF VISION SZ 2 LG GRN

## (undated) DEVICE — TUBING SUCTION 20FT

## (undated) DEVICE — BITE BLOCK ADULT 20 X 27MM (GREEN)

## (undated) DEVICE — SENSOR O2 FINGER ADULT